# Patient Record
Sex: FEMALE
[De-identification: names, ages, dates, MRNs, and addresses within clinical notes are randomized per-mention and may not be internally consistent; named-entity substitution may affect disease eponyms.]

---

## 2017-12-21 ENCOUNTER — TRANSCRIPTION ENCOUNTER (OUTPATIENT)
Age: 32
End: 2017-12-21

## 2019-02-03 ENCOUNTER — FORM ENCOUNTER (OUTPATIENT)
Age: 34
End: 2019-02-03

## 2019-03-25 ENCOUNTER — APPOINTMENT (OUTPATIENT)
Dept: BREAST CENTER | Facility: CLINIC | Age: 34
End: 2019-03-25
Payer: COMMERCIAL

## 2019-03-25 VITALS
SYSTOLIC BLOOD PRESSURE: 132 MMHG | WEIGHT: 158 LBS | HEIGHT: 65 IN | DIASTOLIC BLOOD PRESSURE: 62 MMHG | BODY MASS INDEX: 26.33 KG/M2 | HEART RATE: 83 BPM

## 2019-03-25 DIAGNOSIS — Z80.1 FAMILY HISTORY OF MALIGNANT NEOPLASM OF TRACHEA, BRONCHUS AND LUNG: ICD-10-CM

## 2019-03-25 DIAGNOSIS — C77.3 MALIGNANT NEOPLASM OF UNSPECIFIED SITE OF UNSPECIFIED FEMALE BREAST: ICD-10-CM

## 2019-03-25 DIAGNOSIS — R92.8 OTHER ABNORMAL AND INCONCLUSIVE FINDINGS ON DIAGNOSTIC IMAGING OF BREAST: ICD-10-CM

## 2019-03-25 DIAGNOSIS — C50.912 MALIGNANT NEOPLASM OF UNSPECIFIED SITE OF LEFT FEMALE BREAST: ICD-10-CM

## 2019-03-25 DIAGNOSIS — Z79.899 OTHER LONG TERM (CURRENT) DRUG THERAPY: ICD-10-CM

## 2019-03-25 DIAGNOSIS — C50.919 MALIGNANT NEOPLASM OF UNSPECIFIED SITE OF UNSPECIFIED FEMALE BREAST: ICD-10-CM

## 2019-03-25 PROBLEM — Z00.00 ENCOUNTER FOR PREVENTIVE HEALTH EXAMINATION: Status: ACTIVE | Noted: 2019-03-25

## 2019-03-25 PROCEDURE — 99205 OFFICE O/P NEW HI 60 MIN: CPT

## 2019-03-25 RX ORDER — METOCLOPRAMIDE 5 MG/1
5 TABLET ORAL
Qty: 30 | Refills: 0 | Status: ACTIVE | COMMUNITY
Start: 2019-02-18

## 2019-03-25 RX ORDER — ONDANSETRON HYDROCHLORIDE 4 MG/1
TABLET, FILM COATED ORAL
Refills: 0 | Status: ACTIVE | COMMUNITY

## 2019-03-25 RX ORDER — BETAMETHA AC,SOD PHOS/WATER/PF 6 MG/ML
6 (3-3) VIAL (ML) INJECTION
Qty: 5 | Refills: 0 | Status: ACTIVE | COMMUNITY
Start: 2018-12-03

## 2019-03-25 RX ORDER — ONDANSETRON 8 MG/1
8 TABLET ORAL
Qty: 3 | Refills: 0 | Status: ACTIVE | COMMUNITY
Start: 2019-01-11

## 2019-03-25 RX ORDER — PNV NO.95/FERROUS FUM/FOLIC AC 28MG-0.8MG
TABLET ORAL
Refills: 0 | Status: ACTIVE | COMMUNITY

## 2019-03-25 RX ORDER — PROCHLORPERAZINE MALEATE 10 MG/1
10 TABLET ORAL
Qty: 30 | Refills: 0 | Status: ACTIVE | COMMUNITY
Start: 2019-01-28

## 2019-03-28 NOTE — REVIEW OF SYSTEMS
[Recent Weight Gain (___ Lbs)] : recent [unfilled] ~Ulb weight gain [Vomiting] : vomiting [Diarrhea] : diarrhea [Dysuria] : dysuria [Negative] : Heme/Lymph [Constipation] : no constipation

## 2019-03-28 NOTE — HISTORY OF PRESENT ILLNESS
[FreeTextEntry1] : Pt w/ L Br Ca SD during pregnancy (11/18)\par Delivered frat twin girl by [facility]ect (1/2/19) w/o prob\par Saw Gyn > Bialt Sono (11/8/18): +1.1cm irreg mass L 1:00 N9, +abnl L Ax LN > Bx rec'ed\par S/P L Sono Core Bx (L 1:00 N()(11/8/18): +IDCA, SBR III, ER-, AK-, Her2 3+\par S/P L Ax FNA (w/ Clip Placed)(11/12/18): +malig cells c/w Br\par MRI (2/4/19): +1.8cm mass L 1:00 c/t known Ca, +Bilat add'l abnl's > Rad'st rec'ed Bilat targeted Sono/Sono Bx/Poss MRI Bx, +2cm enh R liver mass\par PET/CT (2/19/19): No met dis, prob liver cysts\par Started Jay Adj chemo (AC/TCHP)([place])\par S/P L Br Bx (19yo): +FA\par BRCA ([facility])(11/18): -\par +FH Br Ca (M. Gr Aunt)\par No other Breast Surgery, Breast Procedures or Nipple Discharge. \par No FH Ovarian, Pancreatic Cancer or Melanoma. \par \par \par

## 2019-03-28 NOTE — PHYSICAL EXAM
[Normocephalic] : normocephalic [Atraumatic] : atraumatic [Supple] : supple [No Supraclavicular Adenopathy] : no supraclavicular adenopathy [No Cervical Adenopathy] : no cervical adenopathy [No Thyromegaly] : no thyromegaly [Normal Sinus Rhythm] : normal sinus rhythm [Examined in the supine and seated position] : examined in the supine and seated position [No dominant masses] : no dominant masses in right breast  [No dominant masses] : no dominant masses left breast [No Nipple Retraction] : no left nipple retraction [No Nipple Discharge] : no left nipple discharge [No Axillary Lymphadenopathy] : no left axillary lymphadenopathy [No Edema] : no edema [No Rashes] : no rashes [No Ulceration] : no ulceration [de-identified] : +R IC SIP in place [de-identified] : + vague th L 2:00 N8 poss c/t jaleelwn Ca?

## 2019-04-01 ENCOUNTER — FORM ENCOUNTER (OUTPATIENT)
Age: 34
End: 2019-04-01

## 2019-06-17 ENCOUNTER — FORM ENCOUNTER (OUTPATIENT)
Age: 34
End: 2019-06-17

## 2019-06-24 NOTE — HP
Admitting History and Physical





- Primary Care Physician


PCP: Zoltan Tirado





- Admission


Chief Complaint: Left breast cancer


History of Present Illness: 





34 year old premenapausal   female who felt a mass left upper quadrant 2018 while she was pregnant with twins at 28 weeks. She received neoadjuvant 

chemotherapy during her pregnancy. She delivered twins and finished 

chemotherapy and is on Taxotere and Herceptin and perjetta. She had follow MRI 

in 2019 that showed right hepatic hemangiomas and some enhancement right 

breast and directed Us revealed fibroadenomas and bxs were being recommended 

right upper inner and left retroareolar region.She had a good clinical response 

to her neoadjuvant chemotherapy.She is scheduled for bilateral mastectomies.


History Source: Patient


Limitations to Obtaining History: No Limitations





- Past Medical History


Endocrine: Yes: Other (PCOS)


Additional Past Medical History: 





S/P neoadjuvant chemotherapy for left breast cancer with axillary involvement





- Smoking History


Smoking history: Never smoked


Have you smoked in the past 12 months: No





- Alcohol/Substance Use


Hx Alcohol Use: Yes (rarely)





Home Medications





- Allergies


Allergies/Adverse Reactions: 


 Allergies











Allergy/AdvReac Type Severity Reaction Status Date / Time


 


No Known Allergies Allergy   Verified 19 09:04














- Home Medications


Home Medications (free text): vitamins,herceptin, perjetta





Family Disease History





- Family Disease History


Other Family History: pat aunt lung cpat GGM breast ca 84.  may GGM had rectal 

ca 82 and breast 84 and ovarian 55.  mat [place] breast ca 50.  mat GF lung ca 70





Physical Examination


Breast(s): Yes: Other (S/P c section and neoadjuvant chemotherapy Moderate 

ptotic full B sized cups no palpable mass left breast or adenopathy right 

negative)





Problem List





- Problems


(1) Breast cancer, left breast


Code(s): C50.912 - MALIGNANT NEOPLASM OF UNSPECIFIED SITE OF LEFT FEMALE BREAST

   


Qualifiers: 


   Breast location: upper outer quadrant of breast   Estrogen receptor status: 

negative   Patient sex: female   Qualified Code(s): C50.412 - Malignant 

neoplasm of upper-outer quadrant of left female breast; Z17.1 - Estrogen 

receptor negative status [ER-]   





Assessment/Plan





Bilateral total mastectomies left sentenel node biopsy,lymphoscintogram, 

possible axillary node dissection, left axillary node biopsy with needle 

localization

## 2019-06-26 ENCOUNTER — FORM ENCOUNTER (OUTPATIENT)
Age: 34
End: 2019-06-26

## 2019-06-27 ENCOUNTER — HOSPITAL ENCOUNTER (INPATIENT)
Dept: HOSPITAL 74 - JSAMEDAYSX | Age: 34
LOS: 2 days | Discharge: HOME | DRG: 581 | End: 2019-06-29
Attending: SURGERY | Admitting: SURGERY
Payer: COMMERCIAL

## 2019-06-27 VITALS — BODY MASS INDEX: 27.1 KG/M2

## 2019-06-27 DIAGNOSIS — Z17.1: ICD-10-CM

## 2019-06-27 DIAGNOSIS — M95.4: ICD-10-CM

## 2019-06-27 DIAGNOSIS — C50.412: Primary | ICD-10-CM

## 2019-06-27 PROCEDURE — 07B60ZX EXCISION OF LEFT AXILLARY LYMPHATIC, OPEN APPROACH, DIAGNOSTIC: ICD-10-PCS | Performed by: SURGERY

## 2019-06-27 PROCEDURE — 0HTV0ZZ RESECTION OF BILATERAL BREAST, OPEN APPROACH: ICD-10-PCS | Performed by: SURGERY

## 2019-06-27 PROCEDURE — 0HUV0JZ SUPPLEMENT BILATERAL BREAST WITH SYNTHETIC SUBSTITUTE, OPEN APPROACH: ICD-10-PCS | Performed by: SURGERY

## 2019-06-27 PROCEDURE — A9541 TC99M SULFUR COLLOID: HCPCS

## 2019-06-27 RX ADMIN — CEFAZOLIN SCH MLS/HR: 1 INJECTION, POWDER, FOR SOLUTION INTRAVENOUS at 21:15

## 2019-06-27 RX ADMIN — CEFAZOLIN SCH MLS: 1 INJECTION, POWDER, FOR SOLUTION INTRAVENOUS at 21:15

## 2019-06-27 RX ADMIN — ACETAMINOPHEN PRN MG: 10 INJECTION, SOLUTION INTRAVENOUS at 17:47

## 2019-06-28 LAB
DEPRECATED RDW RBC AUTO: 16.1 % (ref 11.6–15.6)
HCT VFR BLD CALC: 29.2 % (ref 32.4–45.2)
HGB BLD-MCNC: 9.9 GM/DL (ref 10.7–15.3)
MCH RBC QN AUTO: 32.1 PG (ref 25.7–33.7)
MCHC RBC AUTO-ENTMCNC: 34.1 G/DL (ref 32–36)
MCV RBC: 94.3 FL (ref 80–96)
PLATELET # BLD AUTO: 212 K/MM3 (ref 134–434)
PMV BLD: 7.9 FL (ref 7.5–11.1)
RBC # BLD AUTO: 3.09 M/MM3 (ref 3.6–5.2)
WBC # BLD AUTO: 9.1 K/MM3 (ref 4–10)

## 2019-06-28 RX ADMIN — HEPARIN SODIUM SCH UNIT: 5000 INJECTION, SOLUTION INTRAVENOUS; SUBCUTANEOUS at 10:36

## 2019-06-28 RX ADMIN — CEFAZOLIN SCH MLS/HR: 1 INJECTION, POWDER, FOR SOLUTION INTRAVENOUS at 15:19

## 2019-06-28 RX ADMIN — BACITRACIN ZINC SCH APPLIC: 500 OINTMENT TOPICAL at 10:40

## 2019-06-28 RX ADMIN — CEFAZOLIN SCH MLS/HR: 1 INJECTION, POWDER, FOR SOLUTION INTRAVENOUS at 02:31

## 2019-06-28 RX ADMIN — ACETAMINOPHEN PRN MG: 10 INJECTION, SOLUTION INTRAVENOUS at 16:46

## 2019-06-28 RX ADMIN — HEPARIN SODIUM SCH UNIT: 5000 INJECTION, SOLUTION INTRAVENOUS; SUBCUTANEOUS at 21:27

## 2019-06-28 RX ADMIN — BACITRACIN ZINC SCH: 500 OINTMENT TOPICAL at 04:58

## 2019-06-28 RX ADMIN — ACETAMINOPHEN PRN MG: 10 INJECTION, SOLUTION INTRAVENOUS at 11:29

## 2019-06-28 RX ADMIN — DEXTROSE AND SODIUM CHLORIDE SCH MLS/HR: 5; 450 INJECTION, SOLUTION INTRAVENOUS at 10:37

## 2019-06-28 RX ADMIN — CEFAZOLIN SCH MLS/HR: 1 INJECTION, POWDER, FOR SOLUTION INTRAVENOUS at 10:39

## 2019-06-28 RX ADMIN — DEXTROSE AND SODIUM CHLORIDE SCH: 5; 450 INJECTION, SOLUTION INTRAVENOUS at 18:55

## 2019-06-28 NOTE — OP
DATE OF OPERATION:  2019

 

PREOPERATIVE DIAGNOSIS:  Left breast cancer upper outer quadrant status post

neoadjuvant chemotherapy during pregnancy.

 

POSTOPERATIVE DIAGNOSIS:  Left breast cancer upper outer quadrant status post

neoadjuvant chemotherapy during pregnancy.

 

PROCEDURE:  Bilateral total mastectomies with left axillary sentinel lymph node

biopsy as well as additional lymph node biopsy with needle localization and clip and

bilateral subpectoral direct implant reconstructions by Dr. Madsen with AlloDerm.  

 

ANESTHESIA:  General endotracheal anesthesia. 

 

PRIMARY SURGEON:  Dawn Bowman MD

 

FIRST ASSISTANT:  DOM Wright

 

PRIMARY SURGEON FOR THE BILATERAL DIRECT IMPLANT RECONSTRUCTIONS:  Dawn Madsen MD

 

FIRST ASSISTANT:  Jesus Bobby MD

 

COMPLICATIONS:  There were no complications.

 

INDICATIONS:  Briefly, the patient is a 34-year-old  premenopausal white female

of [place] descent.  She has a strong family history with her paternal

great-grandmother who had breast cancer at age 84.  She has a paternal aunt who had

lung cancer at age 54. She has a maternal great-grandmother who had rectal cancer,

breast cancer, and ovarian cancer.  She has a maternal great-aunt with breast cancer

at age 50, and a maternal grandfather who had lung cancer at age 70.  The patient

felt a mass in the upper outer aspect of the left breast, and I saw her when she was

28 weeks pregnant with twins through IVF.  She had a 1.8 x 0.8 x 1.5 cm density on

ultrasound on the upper outer aspect of the left breast with a suspicious left

axillary lymph node, and core biopsy showed a high grade invasive duct cancer, which

was ER negative, TN negative, HER2 3+, and lymph node biopsy was positive for

metastatic disease.  She was advised on undergoing neoadjuvant chemotherapy with Dr. Stokes during pregnancy.  She did well and then had a  delivering normal

twins, and then received further chemotherapy with Taxotere, Herceptin, and Perjeta. 

After chemotherapy, MRI showed some mild left retroareolar enhancement and some

benign enhancement of the right breast.  She underwent genetic testing, which was

negative.  The patient was advised on undergoing surgery, and she chose to undergo

bilateral mastectomies and was seen by Plastic Surgery, and was it was decided on

undergoing bilateral subpectoral implant reconstruction.  Patient understood the need

for a sentinel lymph node biopsy as well as localized biopsy of the previously

positive node.  

 

DESCRIPTION OF PROCEDURE:  She was brought in for the procedure on 2019. 

She first went to Radiology at [facility] where needle localization

was performed of the previous clip in the lymph node as well as another questionably

suspicious node which was localized.  She underwent a lymphoscintigraphy in Nuclear

Medicine, and was brought up to the holding area.  In the holding area, site

verification was made and informed consent was obtained.  She was marked

preoperatively by the plastic surgeons.  

 

She was brought into the operating room and laid on the OR table in the supine

position.  Venodynes were placed on the lower extremities prior to induction.  She

received 2 g of Ancef prior to incision.  She underwent general endotracheal

anesthesia.  Then, 3 mL of lymphazurin blue were injected intradermally around the

left breast nipple areolar complex.  Both breasts were then sterilely prepped and

draped in the usual fashion with the left arm and axilla prepped in the field.  

 

The left axillary sentinel lymph node biopsy was first performed.  An incision was

made just below the hair-bearing area of the left axilla, and dissection was

undertaken, and blue lymphatics were easily seen coursing to a blue hot lymph node

with a 10-second gamma count of 44,957.  We took out the 2 needle localization areas

of lymph nodes as a superior and inferior specimen and sent them for specimen

radiograph, and the inferior specimen did have the clip, and we dissected out the

node with the clip, sent that for frozen section, which came back negative.  The

superior specimen had a blue hot lymph node with a 10-second gamma count of 2,894,

and that was sent for frozen section and came back negative.  Another hot node with a

10-second gamma count around 4,000 was sent for frozen section and also came back

negative.  At this point, there were no other blue or hot nodes.  There was some

axillary tissue which had to be removed with the needle localizations which were sent

separately as separate left axillary tissue for permanent section.  Hemostasis was

achieved. 

 

The left breast total mastectomy was then performed with a skin-sparing technique

encompassing the nipple areolar complex through an elliptical incision.  Skin flaps

were raised using electrocautery superiorly to the level of the clavicle, medially to

the level of the sternum, laterally to the level of the latissimus, and inferiorly

below the level of the inframammary fold.  The breast was taken out off the

pectoralis major muscle from medial to lateral, completely removed intact.  It was

oriented with the long-lateral short-superior suture, sent for specimen radiograph,

showed removal of the cancerous clip in the upper outer quadrant.  Hemostasis was

achieved.  A separate anterior margin was then taken on the superior skin flap and

sent separately as upper outer quadrant anterior margin with the suture marking the

biopsy cavity side.  This was sent for permanent section in formalin.  Skin flaps

were trimmed, and the wound was copiously irrigated, and hemostasis was achieved. 

The breast was weighed to allow for appropriate cosmetic reconstruction.  

 

At this point, gloves, gowns, instruments were changed, and the right breast was then

approached.  The right breast prophylactic mastectomy was performed again using a

skin-sparing technique with an elliptical incision around the nipple areolar complex.

 The skin flap was raised superiorly to the level of the clavicle, medially to the

level of the sternum, laterally to the level of the latissimus, and inferiorly below

the level of the inframammary fold.  The breast was taken out off the pectoralis

major muscle from medial to lateral, and completely removed intact.  It was oriented

with the long-lateral short-superior suture, and weighed to allow for appropriate

cosmetic result.  It was then placed in formalin, sent to Pathology as specimen. 

Skin flaps were trimmed for good cosmetic result.  Hemostasis was achieved, and the

wound was copiously irrigated with warm sterile saline.  

 

At the end of the mastectomy, it was noted that there was a slight superficial burn

towards the axilla in the upper outer aspect of the right breast maybe 2 cm in

length, likely caused by one of the _____ retractors.  

 

Dr. Madsen then became the primary surgeon, performed bilateral direct implant

reconstructions in the subpectoral location.  AlloDerm was sutured in an inferior

lateral aspect to both pectoralis major muscles to allow for the direct implant

reconstructions.  Two Dre drains will be placed around each implant, brought

through separate stab incisions on the lateral skin flaps and secured in place using

3-0 nylon suture.  All wounds will be closed by Plastic Surgery.  

 

Estimated blood loss after the bilateral mastectomies was about 100 mL, and she was

hemodynamically stable throughout.  All sponge and needle counts were correct at the

end of the case.  The patient will be extubated, recovered in the post-anesthesia

care unit, then be admitted to the floor for postoperative wound and pain management.

 Exparel was injected into the chest walls of both breasts after the mastectomy for

postoperative pain control.  We will place Bacitracin and cold compresses over the

small area of superficial burn on the upper outer aspect of the right breast over the

axilla.  

 

 

DAWN BOWMAN M.D.

 

[id number]

DD: 2019 19:52

DT: 2019 08:06

Job #:  [id number]

## 2019-06-28 NOTE — PN
Progress Note (short form)





- Note


Progress Note: 





Anesthesia POD#1


S/P Bilateral mastectomy with Left axillary node dissection under GA and PCA


VSS,arouse able,no N/V,pain is under control with Dilaudid PCA.


Not taking orals yet.


A/P


Continue PCA for today.





Nany Saunders MD

## 2019-06-28 NOTE — PN
Progress Note, Physician


Chief Complaint: 





Left breast cancer with axillary node involvement S/P bilateral total 

mastectomies left sentenl node and axillary node biopsy implant and alloderm 

reconstruction


History of Present Illness: 





patient was out of bed and felt faint ,  she feels related to oxycodone, BP 

stable,  she feels tightness from implants





- Current Medication List


Current Medications: 


Active Medications





Acetaminophen (Tylenol -)  650 mg PO Q4H PRN


   PRN Reason: FEVER


Acetaminophen (Ofirmev Injection -)  1,000 mg IVPB Q6H PRN


   PRN Reason: MILD PAIN


   Last Admin: 06/27/19 17:47 Dose:  1,000 mg


Bacitracin (Bacitracin -)  1 applic TP DAILY [facility]


   Last Admin: 06/28/19 04:58 Dose:  Not Given


Heparin Sodium (Porcine) (Heparin -)  5,000 unit SQ BID@0800,2000 [facility]


Dextrose/Sodium Chloride (D5-1/2ns -)  1,000 mls @ 100 mls/hr IV ASDIR [facility]


Cefazolin Sodium 1 gm/ (Dextrose)  50 mls @ 100 mls/hr IVPB Q6H-IV [facility]


   Stop: 06/28/19 20:59


   Last Admin: 06/28/19 02:31 Dose:  100 mls/hr


Oxycodone HCl (Roxicodone -)  10 mg PO Q4H PRN


   PRN Reason: PAIN LEVEL 6-10


Oxycodone HCl (Roxicodone -)  5 mg PO Q4H PRN


   PRN Reason: PAIN LEVEL 1-5


   Last Admin: 06/28/19 06:40 Dose:  5 mg


Zolpidem Tartrate (Ambien -)  5 mg PO HS PRN


   PRN Reason: Insomnia











- Objective


Vital Signs: 


 Vital Signs











Temperature  98.4 F   06/28/19 06:29


 


Pulse Rate  73   06/28/19 06:29


 


Respiratory Rate  20   06/28/19 06:29


 


Blood Pressure  143/75   06/28/19 06:29


 


O2 Sat by Pulse Oximetry (%)  99   06/27/19 22:15











Constitutional: Yes: No Distress


Breast(s): Yes: Other (Bilateral chest wall flaps viable with moderate 

echymosis incision intact drains functioning well  small burn area clean and no 

erythema or signs of infection bacitracin applied with dry  dressing)


Labs: 


 CBC, BMP





 06/28/19 06:45 











Problem List





- Problems


(1) Breast cancer, left breast


Code(s): C50.912 - MALIGNANT NEOPLASM OF UNSPECIFIED SITE OF LEFT FEMALE BREAST

   


Qualifiers: 


   Breast location: upper outer quadrant of breast   Estrogen receptor status: 

negative   Patient sex: female   Qualified Code(s): C50.412 - Malignant 

neoplasm of upper-outer quadrant of left female breast; Z17.1 - Estrogen 

receptor negative status [ER-]   





Assessment/Plan





IV antibiotics


SCD while in bed 


spirometry


will decrease oxycodone  to 2.5 mg and add valium 5 mg bid prn spasm 


OOB with assistance


IV tylenol given

## 2019-06-28 NOTE — PN
Progress Note (short form)





- Note


Progress Note: 





Anesthesia post op note.


POD#1, S/P bilateral mastectomy under general anesthesia.


pat seen and examined. VSS. pain well controlled by po meds.


No apparent post anesthesia complications.

## 2019-06-29 VITALS — DIASTOLIC BLOOD PRESSURE: 55 MMHG | SYSTOLIC BLOOD PRESSURE: 121 MMHG | HEART RATE: 89 BPM | TEMPERATURE: 98.3 F

## 2019-06-29 RX ADMIN — BACITRACIN ZINC SCH APPLIC: 500 OINTMENT TOPICAL at 09:27

## 2019-06-29 RX ADMIN — HEPARIN SODIUM SCH UNIT: 5000 INJECTION, SOLUTION INTRAVENOUS; SUBCUTANEOUS at 08:25

## 2019-06-29 NOTE — PN
Progress Note (short form)





- Note


Progress Note: 





Doing very well


Skin flaps great 


Miniml pain


FU next week 


All instructions given

## 2019-06-29 NOTE — PN
Progress Note, Physician


Chief Complaint: 





left breast cancer upper outer quadrant node positive s/p neoadjuvant 

chemotherapy


History of Present Illness: 


The patient developed a ER-/DC- HER2+ left breast cancer while pregnant with 

twins and underwent neoadjuvant chemotherapy and delivered her twins.  She 

finished chemotherapy and presented for bilateral total mastectomies with a 

left axillary sentinel lymph node biopsy and localization of a previous 

positive lymph node with bilateral direct to implant reconstruction with 

alloderm on June 27, 2019.  She did well and was admitted postoperatively for 

post op pain and wound management.








- Current Medication List


Current Medications: 


Active Medications





Acetaminophen (Tylenol -)  650 mg PO Q4H PRN


   PRN Reason: FEVER


Acetaminophen (Ofirmev Injection -)  1,000 mg IVPB Q6H PRN


   PRN Reason: MILD PAIN


   Last Admin: 06/28/19 16:46 Dose:  1,000 mg


Bacitracin (Bacitracin -)  1 applic TP DAILY [facility]


   Last Admin: 06/29/19 09:27 Dose:  1 applic


Diazepam (Valium -)  5 mg PO BID PRN


   PRN Reason: MUSCLE SPASMS


   Last Admin: 06/28/19 15:19 Dose:  5 mg


Heparin Sodium (Porcine) (Heparin -)  5,000 unit SQ BID@0800,2000 [facility]


   Last Admin: 06/29/19 08:25 Dose:  5,000 unit


Dextrose/Sodium Chloride (D5-1/2ns -)  1,000 mls @ 100 mls/hr IV ASDIR [facility]


   Last Admin: 06/28/19 18:55 Dose:  Not Given


Oxycodone HCl (Roxicodone -)  2.5 mg PO Q4H PRN


   PRN Reason: PAIN LEVEL 1-5


   Last Admin: 06/29/19 08:23 Dose:  2.5 mg


Oxycodone HCl (Roxicodone -)  5 mg PO Q4H PRN


   PRN Reason: PAIN LEVEL 6-10


Zolpidem Tartrate (Ambien -)  5 mg PO HS PRN


   PRN Reason: Insomnia


   Last Admin: 06/28/19 21:39 Dose:  5 mg











- Objective


Vital Signs: 


 Vital Signs











Temperature  98.2 F   06/29/19 06:18


 


Pulse Rate  91 H  06/29/19 06:18


 


Respiratory Rate  20   06/29/19 06:18


 


Blood Pressure  136/61   06/29/19 06:18


 


O2 Sat by Pulse Oximetry (%)  96   06/28/19 09:00











Constitutional: Yes: Well Nourished, No Distress, Calm


Eyes: Yes: WNL


HENT: Yes: Nasal Congestion


Neck: Yes: WNL


Cardiovascular: Yes: Regular Rate and Rhythm


Respiratory: Yes: Regular, CTA Bilaterally


Gastrointestinal: Yes: Normal Bowel Sounds, Soft


...Rectal Exam: Yes: Deferred


Genitourinary: Yes: WNL


Breast(s): Yes: Other (Wounds clean, dry, and intact.  Drains functioning well.

  Skin flaps warm and viable.)


Musculoskeletal: Yes: WNL


Extremities: Yes: WNL


Integumentary: Yes: WNL


Wound/Incision: Yes: Clean/Dry, Well Approximated


Neurological: Yes: Alert, Oriented


Psychiatric: Yes: WNL


Labs: 


 CBC, BMP





 06/28/19 06:45 











Problem List





- Problems


(1) Breast cancer, left breast


Assessment/Plan: 


The patient is doing well POD#2 s/p bilateral total mastectomies with left SLN 

biopsy and bilateral direct to implant reconstructions with alloderm.  Her skin 

flaps are warm and viable.  Drains are functioning well.  Dressing changed at 

bedside today.  She has good pain control and is stable for discharge today.  

Home on percocet for pain and cefadroxil antibiotics.  Follow up in [facility] and [facility] offices in 1 week.  No heavy lifting or exercise.  Record 

drain outputs daily.  No bath/shower until drains removed.


Code(s): C50.912 - MALIGNANT NEOPLASM OF UNSPECIFIED SITE OF LEFT FEMALE BREAST

   


Qualifiers: 


   Breast location: upper outer quadrant of breast   Estrogen receptor status: 

negative   Patient sex: female   Qualified Code(s): C50.412 - Malignant 

neoplasm of upper-outer quadrant of left female breast; Z17.1 - Estrogen 

receptor negative status [ER-]

## 2019-06-29 NOTE — DS
Physical Examination


Vital Signs: 


 Vital Signs











Temperature  98.2 F   06/29/19 06:18


 


Pulse Rate  91 H  06/29/19 06:18


 


Respiratory Rate  20   06/29/19 06:18


 


Blood Pressure  136/61   06/29/19 06:18


 


O2 Sat by Pulse Oximetry (%)  96   06/28/19 09:00











Constitutional: Yes: Well Nourished, No Distress, Calm


Eyes: Yes: WNL


HENT: Yes: WNL


Neck: Yes: WNL


Cardiovascular: Yes: Regular Rate and Rhythm


Respiratory: Yes: Regular, CTA Bilaterally


Gastrointestinal: Yes: Normal Bowel Sounds, Soft


...Rectal Exam: Yes: Deferred


Renal/: Yes: WNL


Breast(s): Yes: Other (Mastectomy wounds clean, dry, and intact.  Drains 

functioning well.  Skin flaps warm and viable.)


Musculoskeletal: Yes: WNL


Extremities: Yes: WNL


Integumentary: Yes: WNL


Wound/Incision: Yes: Clean/Dry, Well Approximated


Neurological: Yes: Alert, Oriented


Psychiatric: Yes: WNL


Labs: 


 CBC, BMP





 06/28/19 06:45 











Discharge Summary


Reason For Visit: LT BREAST CA


Left breast cancer upper outer quadrant ER-/TN- HER2+ s/p neoadjuvant 

chemotherapy





Procedures: Principal: Bilateral total mastectomies with left axillary sentinel 

lymph node biopsy and separate localized axillary node biopsy with bilateral 

direct to implant reconstructions with alloderm


Hospital Course: 


The patient was admitted postoperatively after bilateral total mastectomies 

with bilateral direct to implant reconstructions.  She did well postoperatively 

and had good pain control and her skin flaps were warm and viable for discharge 

on POD#2.  She is to follow up in Sarath Madsen and Celestino's office in 1 week.





Condition: Good





- Instructions


Diet, Activity, Other Instructions: 


Post Operative Instructions - [facility]





We hope your recovery will be uneventful. For those of you who have been given


general anesthesia, there is a possibility you might have some lightheadedness


and possibly nausea.





It is important that each patient, especially those who have had general 


anesthesia, follow these instructions, please:





1. Do NOT operate a motor vehicle for 24 hours.


2. Do NOT drink any alcoholic beverages for 24 hours.


3. Do NOT take any sedatives, narcotics, or tranquilizers for 24 hours


    unless specifically ordered by your surgeon.


4. Do NOT undertake any strenuous exercise or outside activity for 24 hours 


    unless specifically permitted by your surgeon.


5. Eat light foods that are easy to digest. If you have any problems with nausea


   and vomiting, lie down and rest. If it continues, call your surgeon.


6. Call your surgeon AT ONCE if you have problems with:


   a. Bleeding


   b. Urinating


   c. Excessive pain or drainage


   d. Numbness


      


If any problems occur, call your physician first. If you cannot reach him/her, 

call


the Ambulatory Surgery Unit at [phone], or the Emergency Room at [phone].


Follow up with Sarath Tirado / Maryanne in 7 days.





Medication: Vicodin E-S OR Percocet 1-2 tablets every 4-6 hrs as needed for 5-7 

days.





Wound Care: Keep wound dry and clean for 48 hours. You may remove the dressing 

after 


                    48 hours and may shower. Keep steri-strips in place until 

follow-up appointment


                    No heavy lifting or strenuous activities.


Post Operative Instructions - [facility]





We hope your recovery will be uneventful. For those of you who have been given


general anesthesia, there is a possibility you might have some lightheadedness


and possibly nausea.





It is important that each patient, especially those who have had general 


anesthesia, follow these instructions, please:





1. Do NOT operate a motor vehicle for 24 hours.


2. Do NOT drink any alcoholic beverages for 24 hours.


3. Do NOT take any sedatives, narcotics, or tranquilizers for 24 hours


    unless specifically ordered by your surgeon.


4. Do NOT undertake any strenuous exercise or outside activity for 24 hours 


    unless specifically permitted by your surgeon.


5. Eat light foods that are easy to digest. If you have any problems with nausea


   and vomiting, lie down and rest. If it continues, call your surgeon.


6. Call your surgeon AT ONCE if you have problems with:


   a. Bleeding


   b. Urinating


   c. Excessive pain or drainage


   d. Numbness


      


If any problems occur, call your physician first. If you cannot reach him/her, 

call


the Ambulatory Surgery Unit at [phone], or the Emergency Room at [phone].


Follow up with Sarath Tirado / Maryanne in 7 days.





Medication: Vicodin E-S OR Percocet 1-2 tablets every 4-6 hrs as needed for 5-7 

days.





Wound Care: Keep wound dry and clean for 48 hours. You may remove the dressing 

after 


                    48 hours and may shower. Keep steri-strips in place until 

follow-up appointment


                    No heavy lifting or strenuous activities.


Post Operative Instructions - [facility]





We hope your recovery will be uneventful. For those of you who have been given


general anesthesia, there is a possibility you might have some lightheadedness


and possibly nausea.





It is important that each patient, especially those who have had general 


anesthesia, follow these instructions, please:





1. Do NOT operate a motor vehicle for 24 hours.


2. Do NOT drink any alcoholic beverages for 24 hours.


3. Do NOT take any sedatives, narcotics, or tranquilizers for 24 hours


    unless specifically ordered by your surgeon.


4. Do NOT undertake any strenuous exercise or outside activity for 24 hours 


    unless specifically permitted by your surgeon.


5. Eat light foods that are easy to digest. If you have any problems with nausea


   and vomiting, lie down and rest. If it continues, call your surgeon.


6. Call your surgeon AT ONCE if you have problems with:


   a. Bleeding


   b. Urinating


   c. Excessive pain or drainage


   d. Numbness


      


If any problems occur, call your physician first. If you cannot reach him/her, 

call


the Ambulatory Surgery Unit at [phone], or the Emergency Room at [phone].


Follow up with Sarath Tirado / Maryanne in 7 days.





Medication: Vicodin E-S OR Percocet 1-2 tablets every 4-6 hrs as needed for 5-7 

days.





Wound Care: Keep wound dry and clean for 48 hours. You may remove the dressing 

after 


                    48 hours and may shower. Keep steri-strips in place until 

follow-up appointment


                    No heavy lifting or strenuous activities. WEAR BRA ,Empty 

and record AIDA output twice daily,No shower





Referrals: 


Zoltan Tirado MD [Staff Physician] - 


Les Madsen MD [Staff Physician] - 


Disposition: HOME





- Home Medications


Comprehensive Discharge Medication List: 


Ambulatory Orders





Cefadroxil 500 mg PO BID #20 capsule 06/28/19 


Diazepam [Valium] 5 mg PO Q8H PRN #10 tablet MDD 2 06/28/19 


Oxycodone HCl/Acetaminophen [Percocet 5-325 mg Tablet] 1 tab PO Q6H PRN #20 

tablet MDD 4 06/28/19

## 2019-07-01 NOTE — OP
DATE OF OPERATION:  06/27/2019

 

LOCATION:  [facility].

 

NOTE:  This is a combined dictation with Dr. Dawn Tirado for bilateral implant

and AlloDerm reconstruction.

 

PREOPERATIVE DIAGNOSES:

1.  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

(611.89).

2.  Personal history of genetic carcinoma.

 

POSTOPERATIVE DIAGNOSES:

1.  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

(611.89).

2.  Personal history of genetic carcinoma.

 

PROCEDURE:

1.  Right immediate breast reconstruction utilizing immediate insertion of silicone

breast implant and AlloDerm reconstruction.

2.  Left immediate breast reconstruction utilizing immediate insertion of silicone

breast implant and AlloDerm reconstruction.

3.  Intravenous injection of indocyanine green dye and intraoperative diagnostic

evaluation of non-coronary intraoperative fluorescein vascular angiography x 2. 

 

SURGEON:  Dr. MUKESH Madsen

 

ANESTHESIA: GENERAL

 

OPERATIVE PROCEDURE IN DETAIL:  The patient was taken to the operating room.  After

induction of general anesthesia in the supine position, both arms were extended and

padded.  Venodyne boots were placed.  The entire chest wall was painted with

ChloraPrep solution over its entire extent, and sterile drapes were placed in the

usual fashion.  The markings, which had been made in the standing position

preoperatively, were reoutlined with the patient's knowledge.  Time-out procedure was

performed. 

 

Attention was turned by Dr. Tirado to the mastectomies.  Bilateral inframammary

incisions were made and Dr. Tirado performed mastectomies. This will be dictated

under separate cover.

 

Upon completion of the mastectomies, the wounds were copiously irrigated and

attention was turned to the right breast.  A subpectoral dissection was begun on the

right breast, superiorly from the second rib, medially to the sternal fibers, and

down to the inframammary fold, elevating the pectoralis major muscle from its

insertion.

 

At this point, an 8.0 x 16.0 sheet of AlloDerm was brought into the field and sutured

superiorly along the pectoralis major muscle after rehydration.  This was carried

along the lateral mammary fold and down the side of the breast reconstruction.  At

this point, a Sientra style 107, high-profile, round, smooth, 415 mL implant was

chosen.  The left breast tissue removed was 326 gm, and the right breast

approximately 322 gm.  This implant was placed and then sutured with 3-0 Vicryl

suture continued along the inframammary fold, completely covering the implant itself.

 

The exact same procedure was carried out symmetrically on the opposite breast, also

placing a Sientra style 107, high-profile, round, smooth, 415 mL implant in the same

subpectoral pocket.  She had contour AlloDerm large sheets placed bilaterally for

retropectoral reconstruction.  Good symmetry was seen in the sitting position.  

 

After the implants were in place, the patient was injected with 10 mL of indocyanine

green dye and the Spy imaging system was brought into the field.  The skin flow to

the right and left breasts and the nipple areolar complex, and the entire skin flaps

were evaluated and seen to be viable with good blood flow. 

 

Two Dre drains were brought out through separate stab wounds laterally.  The Smart

Infuser pump catheter was inserted medially and into the subpectoral position.  Both

wounds were closed symmetrically using 3-0 PDS suture on the deep tissue, 3-0 in a

deep dermal fashion, and 4-0 in a subcuticular fashion.  Both wounds were dressed

sterilely with Mastisol and Steri-Strips with a surgical bra and a compression strap.

 

The patient tolerated the procedure well.  She was awakened, extubated and

transferred to the recovery room in satisfactory condition.

 

The assistant was present during the entire portion of the operation and closure. 

 

 

 

DAWN MADSEN M.D.

 

[id number]

DD: 07/01/2019 07:54

DT: 07/01/2019 11:11

Job #:  [id number]

## 2019-07-02 ENCOUNTER — FORM ENCOUNTER (OUTPATIENT)
Age: 34
End: 2019-07-02

## 2019-07-03 NOTE — PATH
Surgical Pathology Report



Patient Name:  JAARD ALICIA

Accession #:  [id number]

Med. Rec. #:  [id number]                                                        

   /Age/Gender:  1985 (Age: 34) / F

Account:  [id number]                                                          

             Location: [facility] MED/SURG

Taken:  2019

Received:  2019

Reported:  7/3/2019

Physicians:  Zoltan Tirado M.D.

  



Specimen(s) Received

A: SENTINEL LYMPH NODE LEFT AXILLA #1(FS) 

B: SENTINEL LYMPH NODE LEFT AXILLA  #2 (FS) 

C: LYMPH NODE WITH CLIP LEFT AXILLA (FS) 

D: SENTINEL LYMPH NODE LEFT AXILLA #4 (FS) 

E: LEFT BREAST MASTECTOMY 

F: RIGHT BREAST MASTECTOMY 

G: EXTRA  AXILLARY TISSUE LEFT SIDE 

H: LEFT ANTERIOR MARGIN 





Clinical History

Left breast cancer





Intraoperative Consult Diagnosis

A. [place] node #1 left axilla, frozen section: One negative lymph node (0/1).



B. [place] lymph node #2 left axilla, frozen section: One negative lymph node

(0/1).



C. Frozen section node with clip left axilla, frozen section: One negative lymph

node (0/1). Prior biopsy site changes and fibrosis present.



D. [place] node #4 left axilla, frozen section: One negative lymph node (0/1).



NORBERT Sanchez M.D., 2019









Final Diagnosis

A. lymph node, left axillary sentinel #1, excision (FS):

One lymph node, negative for metastatic carcinoma on H&E stained sections and

cytokeratin (AE1/3) immunostain (0/1). 

NO AREAS OF FIBROUS SCAR ARE IDENTIFIED IN THE LYMPH NODE. 



B. lymph node, left axillary sentinel #2, excision (FS):

One lymph node, negative for metastatic carcinoma on H&E stained sections and

cytokeratin (AE1/3) immunostain (0/1). 

NO AREAS OF FIBROUS SCAR ARE IDENTIFIED IN THE LYMPH NODE.



C. lymph node with clip, left axilla, EXCISION (FS):

One lymph node, negative for metastatic carcinoma on H&E stained sections and

cytokeratin (AE1/3) immunostain (0/1).

THE LYMPH NODE SHOWS A FOCUS OF FIBROUS SCAR AND ASSOCIATED CALCIFICATIONS,

CONSISTENT WITH PRIOR BIOPSY SITE/ SITE OF TREATED METASTATIC TUMOR.



D. lymph node, left axillary sentinel #4, excision (FS):

One lymph node, negative for metastatic carcinoma on H&E stained sections and

cytokeratin (AE1/3) immunostain (0/1).

NO AREAS OF FIBROUS SCAR ARE IDENTIFIED IN THE LYMPH NODE.



E. breast, left, total mastectomy:

Benign breast tissue showing areas of dense hyalinizing fibrosis, compatible

with treated tumor bed tissue.

No residual carcinoma is identified.

Nipple and skin with no pathologic findings.

ONE BENIGN INTRAMAMMARY LYMPH NODE.

Pathologic stage (ypTNM): ypT0 ypN0.



F. breast, right, total mastectomy:

Benign breast tissue.

Nipple and skin with no pathologic findings.



G. extra axillary tissue, left, excision:

SIX Lymph nodes, negative for metastatic carcinoma (0/6). 

NO AREAS OF FIBROUS SCAR ARE IDENTIFIED IN THE LYMPH NODES.



H. anterior margin, left, excision:

Benign breast tissue.





***Electronically Signed***

Gianna Sanchez M.D.





Gross Description

A. Received fresh labeled "sentinel node #1 left axilla," is a 0.7 x 0.5 x 0.5

cm lymph node with attached fat. The lymph node is bisected and entirely

submitted for frozen section. The frozen section residue is entirely submitted

in one cassette.



B. Received fresh labeled "sentinel lymph node #2 left axilla," is a 1.5 x 0.4 x

0.3 cm lymph node with attached fat. The lymph node is submitted in toto for

frozen section. The frozen section residue is entirely submitted in one

cassette.



C. Received fresh labeled "lymph node with clip left axilla," is a 1.5 x 1.1 x

0.6 cm portion of fibrofatty tissue containing a lymph node. There is a needle

localization wire present. There is a gray metallic biopsy clip located within

the lymph node. The specimen is bisected and entirely submitted for frozen

section. The frozen section residue is entirely submitted in one cassette.



D. Received fresh labeled "sentinel node #4 left axilla," is a 0.8 x 0.6 x 0.3

cm lymph node with attached fat. The lymph node is submitted in toto for frozen

section. The frozen section residue is entirely submitted in one cassette.



E. Received in formalin, labeled "left breast," is a 340 gram, 15.0 x 14.0 x 4.0

cm. left mastectomy specimen with a short suture marking the superior aspect and

a long suture marking the lateral aspect of the specimen, per the surgeon. The

anterior surface displays a 4.5 x 2.2 cm tan, elliptical portion of skin with a

1.2 cm in diameter nipple. The deep margin is inked black and the anterior soft

tissue margin is inked blue. The specimen is serially sectioned from medial to

lateral. Sectioning reveals abundant dense, white, focally firm fibrous tissue.

No definitive residual mass is identified. Representative sections are submitted

in 25 cassettes as follows: 1-serially sectioned nipple; 2-subareolar shave;

3-10-upper outer quadrant; 11-13-lower outer quadrant; 14-16-upper inner

quadrant; 17-19-lower inner quadrant; 20-skin and anterior soft tissue margin;

21-deep margin; 22-25- additional fibrous tissue.



F. Received in formalin, labeled "right breast," is a 325 gram, 13.5 x 13.0 x

3.3 cm. right mastectomy specimen with a short suture marking the superior

aspect and a long suture marking the lateral aspect of the specimen, per the

surgeon. The anterior surface displays a 4.3 x 2.5 cm tan, elliptical portion of

skin with a 1.0 cm in diameter nipple. The deep margin is inked black and the

anterior soft tissue margin is inked blue. The specimen is serially sectioned

from lateral to medial. Sectioning reveals abundant dense, white, focally firm

fibrous tissue. No definitive mass is identified. Representative sections are

submitted in 15 cassettes as follows: 1-serially sectioned nipple; 2-subareolar

shave; 3-4-upper outer quadrant; 5-6-lower outer quadrant; 7-9-upper inner

quadrant; 10-13-lower inner quadrant; 14-skin and anterior soft tissue margin;

15-deep margin.

Total formalin fixation time: Approximately 24 hours



G. Received in formalin labeled "extra axillary tissue left side," is a 7.0 x

6.0 x 2.3 cm aggregate of multiple portions of yellow, lobulated adipose tissue.

The largest portion displays an attached needle localization wire. Sectioning

reveals 3 possible lymph nodes measuring up to 1.7 cm in greatest dimension. The

lymph nodes are entirely submitted in 4 cassettes as follows: 1-one whole

possible lymph node; 2-one bisected lymph node; 3-4-one bisected lymph node.



H. Received in formalin labeled "left anterior margin," is a 3.2 x 3.0 x 1.4 cm

portion of fibroadipose tissue with a suture marking the biopsy cavity side, per

the surgeon. The new margin is inked blue and the specimen is serially

sectioned. The specimen is entirely and sequentially submitted in 5 cassettes.





[facility]

## 2019-07-16 ENCOUNTER — FORM ENCOUNTER (OUTPATIENT)
Age: 34
End: 2019-07-16

## 2019-08-05 ENCOUNTER — FORM ENCOUNTER (OUTPATIENT)
Age: 34
End: 2019-08-05

## 2019-08-20 ENCOUNTER — FORM ENCOUNTER (OUTPATIENT)
Age: 34
End: 2019-08-20

## 2019-11-19 ENCOUNTER — FORM ENCOUNTER (OUTPATIENT)
Age: 34
End: 2019-11-19

## 2020-06-02 ENCOUNTER — FORM ENCOUNTER (OUTPATIENT)
Age: 35
End: 2020-06-02

## 2020-06-05 ENCOUNTER — RESULT REVIEW (OUTPATIENT)
Age: 35
End: 2020-06-05

## 2020-06-08 ENCOUNTER — RESULT REVIEW (OUTPATIENT)
Age: 35
End: 2020-06-08

## 2020-06-24 ENCOUNTER — HOSPITAL ENCOUNTER (INPATIENT)
Dept: HOSPITAL 74 - FM/S | Age: 35
Discharge: HOME | DRG: 584 | End: 2020-06-24
Attending: SURGERY | Admitting: SURGERY
Payer: COMMERCIAL

## 2020-06-24 VITALS — TEMPERATURE: 98.2 F

## 2020-06-24 VITALS — HEART RATE: 72 BPM | DIASTOLIC BLOOD PRESSURE: 62 MMHG | SYSTOLIC BLOOD PRESSURE: 118 MMHG

## 2020-06-24 VITALS — BODY MASS INDEX: 24.1 KG/M2

## 2020-06-24 DIAGNOSIS — M95.4: ICD-10-CM

## 2020-06-24 DIAGNOSIS — T85.42XA: ICD-10-CM

## 2020-06-24 DIAGNOSIS — N65.1: Primary | ICD-10-CM

## 2020-06-24 DIAGNOSIS — Z85.3: ICD-10-CM

## 2020-06-24 DIAGNOSIS — N65.0: ICD-10-CM
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
## 2020-06-24 PROCEDURE — 0HPT0JZ REMOVAL OF SYNTHETIC SUBSTITUTE FROM RIGHT BREAST, OPEN APPROACH: ICD-10-PCS | Performed by: SURGERY

## 2020-06-24 PROCEDURE — 0HPU0JZ REMOVAL OF SYNTHETIC SUBSTITUTE FROM LEFT BREAST, OPEN APPROACH: ICD-10-PCS | Performed by: SURGERY

## 2020-06-24 PROCEDURE — 0HRV0JZ REPLACEMENT OF BILATERAL BREAST WITH SYNTHETIC SUBSTITUTE, OPEN APPROACH: ICD-10-PCS | Performed by: SURGERY

## 2020-06-24 NOTE — SURG
Surgery First Assist Note


First Assist: Tanner Mary PA-C


Date of Service: 06/24/20


Diagnosis: 


Breast cancer s/p bilateral mastectomy with reconstrution





Procedure: 


 Revision of bilateral breast reconstruction, with implant exchange, and 

subcutaneous tissue transfer to bilateral breasts





I was present for the entirety of the operative procedure. For further detail, 

please refer to operative report.








Visit type





- Case Type


Case Type: Scheduled





- Emergency


Emergency Visit: No





- New patient


This patient is new to me today: Yes


Date on this admission: 06/24/20





- Critical Care


Critical Care patient: No

## 2020-06-24 NOTE — OP
Operative Note





- Note:


Operative Date: 06/24/20


Pre-Operative Diagnosis: Breast cancer s/p bilateral mastectomy with 

reconstrution


Operation: Revision of bilateral breast reconstruction, with implant exchange, 

and subcutaneous tissue transfer to bilateral breasts


Post-Operative Diagnosis: Same as Pre-op


Surgeon: Les Madsen


Assistant: Tanner Mary


Anesthesiologist/CRNA: Dina Monreal


Anesthesia: General


Estimated Blood Loss (mls): 10


Operative Report Dictated: Yes

## 2020-07-03 NOTE — OP
DATE OF OPERATION:  06/24/2020

 

SURGEON:  Dawn Madsen MD

 

ASSISTANT SURGEON:  Tanner Mary PA-C

 

PREOPERATIVE DIAGNOSES:

1.  Bilateral acquired chest wall deformity, status post bilateral mastectomy.

2.  Asymmetry of reconstructed chest wall.

3.  Personal history of breast carcinoma.

4.  Malposition and mechanical complication of breast implant.

 

POSTOPERATIVE DIAGNOSES:

1.  Bilateral acquired chest wall deformity, status post bilateral mastectomy.

2.  Asymmetry of reconstructed chest wall.

3.  Personal history of breast carcinoma.

4.  Malposition and mechanical complication of breast implant.

 

OPERATIVE PROCEDURE:

1.  Right breast reconstruction utilizing other technique.

2.  Left breast reconstruction utilizing other technique.

3.  Right breast capsulotomy, removal and replacement of right breast implant.

4.  Left breast capsulotomy, removal and replacement of left breast implant.

 

OPERATIVE INDICATION:  Patient is a young woman of 35 years old, who underwent

bilateral breast reconstruction and now presents with asymmetry of the 
reconstructed

chest wall and requires the above procedures for the indications.  The risks and

benefits, surgical versus nonsurgical alternatives, as well as the material

complications were described to the patient on multiple occasions 
preoperatively, and

she agreed to the planned procedure.

 

OPERATIVE PROCEDURE IN DETAIL:  Patient was taken to the operating room where 
after

the induction of general anesthesia in supine position, both arms were extended 
and

padded, Venodyne boots were placed, and the entire area was prepped and draped 
with

timeout called.

 

At this point, the mastectomy scars were injected with Marcaine 0.25% with

epinephrine.  At this point, the mastectomy scars were excised along the mid-
portion

of the breast in transverse fashion according to the previous scars, and this 
was

carried down through the skin, through the subcutaneous tissue, down to the

underlying musculature.  The muscles were then divided along the course of the

incision, and the capsule was visualized.  I then performed a capsulotomy by 
incising

the capsule along the course of the incision, down through tissue, into the area

where the capsule was opened and the implant seen and removed.  This right 
breast

implant was sent for pathologic diagnosis as was the capsule itself.  Once this 
was

accomplished, capsulotomy was performed throughout the pocket superiorly, 
medially,

and in all directions for correction of the asymmetry and malposition.  Using 
the

electrocautery, hemostasis was obtained throughout the pocket, creating a space 
for the

new reconstructive implant.

 

At this point, attention was turned to the opposite breast.  The same or similar

excision of the skin and subcutaneous tissue of the mastectomy scar was carried 
out

of the left breast.  Dissection was carried through the subcutaneous tissue and 
also

performed capsulotomy to remove the implant and expand the pocket.  At this 
point,

after copious irrigation of the pocket with both triple-antibiotic solution and

Betadine, an implant was chosen.

 

A Sientra smooth, round, high-profile, silicone gel style 107, 440-mL implant 
was

chosen.  This was placed into the breast pocket using the Brennan funnel.  This

no-touch technique was carried out to prevent any bacterial contaminations.  
Once the

implant was in place, the wounds were closed in layers using 2-0 PDS suture on 
the

deep muscularis and deeper tissues in interrupted fashion, and once this deeper 
layer

on both the right and left breast was closed independently, attention was turned
to

the abdominal wall.

 

In order for reconstructive tissue to be harvested, incisions were made down 
through

skin and subcutaneous tissue, through the subcutaneous tissue into the deep 
layers

over the rectus muscle medially and laterally over the external oblique 
musculature. 

At this point, tissue was then harvested from these areas over the rectus and

external oblique muscles, transferred to the back table, washed, cleansed, and

prepared for reconstruction.  Once this tissue was harvested, donor sites were 
closed

with interrupted running sutures, and then, the tissue was transferred to the 
medial,

superior, central portions of the right breast; the inferior, superior, medial, 
and

central portions of the left breast independently.  Good shape and symmetry were
seen

in the sitting position.  At this point, after the donor sites had been closed, 
the

mastectomy scars were also closed using 3-0 PDS suture in the deep dermis and 4-
0

absorbable suture on the skin.  Patient tolerated the procedure well.  All 
wounds

were dressed sterilely with Dermabond, Steri-Strips, and a compressive dressing 
with

a Surgi-Bra.  She tolerated the procedure well, awakened, extubated, and 
transferred

to the recovery room in satisfactory condition.

 

 

DAWN MADSEN M.D.

 

[id number]

DD: 07/03/2020 13:42

DT: 07/03/2020 18:44

Job #:  [id number]

MTDD

## 2020-12-01 ENCOUNTER — FORM ENCOUNTER (OUTPATIENT)
Age: 35
End: 2020-12-01

## 2021-06-09 ENCOUNTER — APPOINTMENT (OUTPATIENT)
Dept: BREAST CENTER | Facility: CLINIC | Age: 36
End: 2021-06-09

## 2021-07-06 DIAGNOSIS — E28.2 POLYCYSTIC OVARIAN SYNDROME: ICD-10-CM

## 2021-07-06 DIAGNOSIS — Z78.9 OTHER SPECIFIED HEALTH STATUS: ICD-10-CM

## 2021-07-06 DIAGNOSIS — Z80.3 FAMILY HISTORY OF MALIGNANT NEOPLASM OF BREAST: ICD-10-CM

## 2021-07-06 DIAGNOSIS — Z31.83 ENCOUNTER FOR ASSISTED REPRODUCTIVE FERTILITY PROCEDURE CYCLE: ICD-10-CM

## 2021-07-09 ENCOUNTER — APPOINTMENT (OUTPATIENT)
Dept: BREAST CENTER | Facility: CLINIC | Age: 36
End: 2021-07-09
Payer: COMMERCIAL

## 2021-07-09 ENCOUNTER — NON-APPOINTMENT (OUTPATIENT)
Age: 36
End: 2021-07-09

## 2021-07-09 VITALS
HEART RATE: 88 BPM | BODY MASS INDEX: 26.33 KG/M2 | SYSTOLIC BLOOD PRESSURE: 108 MMHG | HEIGHT: 65 IN | WEIGHT: 158 LBS | DIASTOLIC BLOOD PRESSURE: 73 MMHG

## 2021-07-09 PROCEDURE — 99213 OFFICE O/P EST LOW 20 MIN: CPT

## 2021-07-09 NOTE — REVIEW OF SYSTEMS
[Recent Weight Gain (___ Lbs)] : recent [unfilled] ~Ulb weight gain [Abdominal Pain] : abdominal pain [Heartburn] : heartburn [Muscle Weakness] : muscle weakness [As Noted in HPI] : as noted in HPI [Easy Bruising] : a tendency for easy bruising [Negative] : Psychiatric

## 2021-07-09 NOTE — ASSESSMENT
[FreeTextEntry1] : The patient is a 36-year-old G4, P3 premenopausal white female of [place] descent. She underwent menarche at age 12 and had her first child at age 31. She has a strong family history with her paternal aunt who had lung cancer at age 54 and her paternal great grandmother who had breast cancer at age 84. Her maternal great grandmother had rectal cancer at age 82 and then breast cancer at age 84 and then ovarian cancer at age 55. She has a maternal great aunt who had breast cancer at age 50 and her maternal grandfather had lung cancer at age 70. The patient had her first child in  with IVF and then had a failed IVF treatment in  and became pregnant with twins through IVF in 2018. She felt a left breast upper outer quadrant mass around the beginning of 2018 when she was 28 weeks pregnant with her twins and she was sent for an ultrasound which showed a suspicious density over the palpable region measuring 1.1 x 0.8 x 1.5 cm with a suspicious lower left axillary lymph node. Ultrasound-guided core biopsy showed a high-grade invasive duct cancer which was ER/DC negative HER-2/mal 3+ by IHC. Lymph node FNA also showed metastatic cancer. Genetic testing with [employer] risk panel testing was performed at [place] showed a VUS in the T p53 gene. She was seen by Dr. Gibbs at the [place] [facility] and initially surgery was recommended. However, after discussing the case with Dr. Gibbs and the patient's gynecologist, Dr. Goode, it was decided for the patient to undergo neoadjuvant chemotherapy and she underwent AC through Dr. Brasher and then had a  delivering her twins and finished the AC chemotherapy and then Taxotere Herceptin and Perjeta postpartum. She had a follow-up MRI in 2019 showing a lesion in the right hepatic lobe which turned out to be hemangioma but there are also some enhancing focus is in the left breast retroareolar region and some clumped progressive enhancement in the right breast. Directed ultrasound showed a couple benign small fibroadenomas in the right breast and MRI biopsies were recommended in the right breast upper inner region and left breast retroareolar region. She decided to undergo bilateral total mastectomies and I also performed a localized sentinel lymph node biopsy in the left axilla and she had bilateral subpectoral direct implant reconstructions with AlloDerm on 2019. Final pathology showed 4 negative sentinel lymph nodes with the node with the clip just showing fibrosis. The cancer was completely gone from the left breast on final pathology only showing hyalinizing fibrosis. She did have another 6 nodes removed on the left side making a total of 10 negative nodes. This was a clinical prognostic stage IIA breast cancer prior to neoadjuvant chemotherapy with a complete pathologic response after neoadjuvant chemotherapy. The patient was seen by Dr. Brasher and received external beam radiation to Dr. Salas Sullivan at [facility] which started on 2019 and finished on 2019. She finished Herceptin through Dr. Brasher on 2020 and he attempted to put her on neratinib but she could not tolerate it. She had her right sided Port-A-Cath removed and did develop some hypertrophic scarring. She did have a right breast mastopexy and revision of her implants by Dr. Cyr in 2020.  On exam today I cannot feel any evidence of recurrence over the left implant.  The patient was reassured and should follow-up again in 6 months.

## 2021-07-09 NOTE — PHYSICAL EXAM
[Normocephalic] : normocephalic [Atraumatic] : atraumatic [EOMI] : extra ocular movement intact [Supple] : supple [No Supraclavicular Adenopathy] : no supraclavicular adenopathy [No Cervical Adenopathy] : no cervical adenopathy [Examined in the supine and seated position] : examined in the supine and seated position [No dominant masses] : no dominant masses in right breast  [No dominant masses] : no dominant masses left breast [Breast Mass Right Breast ___cm] : no masses [Breast Mass Left Breast ___cm] : no masses [No Axillary Lymphadenopathy] : no left axillary lymphadenopathy [No Edema] : no edema [No Rashes] : no rashes [No Ulceration] : no ulceration [de-identified] : On exam, the patient has the obvious bilateral total mastectomies with a left sentinel lymph node biopsy and had bilateral direct implant reconstructions in the subpectoral location.  She received radiation of the left implant and has some more tightness on the left side.  She has no suspicious findings over either implant.  Her cosmetic result improved after exchange of her implants in June 2020.  She did have a superficial burn in the upper outer aspect of the right breast and also developed a hypertrophic scar over the Port-A-Cath site.  She has no axillary, supraclavicular, or cervical adenopathy. [de-identified] : Status post total mastectomy with subpectoral direct to implant reconstruction with no suspicious findings. [de-identified] : Status post total mastectomy with subpectoral direct to implant reconstruction with no evidence of recurrence

## 2021-07-09 NOTE — REASON FOR VISIT
[Follow-Up: _____] : a [unfilled] follow-up visit [FreeTextEntry1] : The patient has a strong family history of breast cancer as well as ovarian cancer and was diagnosed with a high-grade invasive cancer which was ER/HI negative HER-2/mal 3+ which was noted towards the end of her last pregnancy with twins. Genetic testing was negative and she received neoadjuvant AC chemotherapy with Dr. Brasher towards the end of her pregnancy and then delivered her twins by  and finished the rest of her chemotherapy receiving Herceptin and Perjeta as well. MRI after neoadjuvant chemotherapy showed some other findings in the right breast which were felt to be benign however she decided to undergo bilateral total mastectomies and had a localized sentinel lymph node biopsy with bilateral subpectoral direct to implant reconstructions with AlloDerm in 2019. She had 4 negative sentinel lymph nodes and did have another 6 nodes removed for total of 10 negative nodes. This was a clinical pathologic stage IIA breast cancer prior to chemotherapy but with a complete pathologic response after neoadjuvant chemotherapy. She received external beam radiation over the left implant and finished Herceptin and could not tolerate neratinib. She had further revision on the right side for symmetry and comes in now for routine follow-up.

## 2021-07-09 NOTE — HISTORY OF PRESENT ILLNESS
[FreeTextEntry1] : The patient is a 36-year-old G4, P3 premenopausal white female of [place] descent. She underwent menarche at age 12 and had her first child at age 31. She has a strong family history with her paternal aunt who had lung cancer at age 54 and her paternal great grandmother who had breast cancer at age 84. Her maternal great grandmother had rectal cancer at age 82 and then breast cancer at age 84 and then ovarian cancer at age 55. She has a maternal great aunt who had breast cancer at age 50 and her maternal grandfather had lung cancer at age 70. The patient had her first child in  with IVF and then had a failed IVF treatment in  and became pregnant with twins through IVF in 2018. She felt a left breast upper outer quadrant mass around the beginning of 2018 when she was 28 weeks pregnant with her twins and she was sent for an ultrasound which showed a suspicious density over the palpable region measuring 1.1 x 0.8 x 1.5 cm with a suspicious lower left axillary lymph node. Ultrasound-guided core biopsy showed a high-grade invasive duct cancer which was ER/MN negative HER-2/mal 3+ by IHC. Lymph node FNA also showed metastatic cancer. Genetic testing with [employer] risk panel testing was performed at [place] showed a VUS in the T p53 gene. She was seen by Dr. Gibbs at the [place] [facility] and initially surgery was recommended. However, after discussing the case with Dr. Gibbs and the patient's gynecologist, Dr. Goode, it was decided for the patient to undergo neoadjuvant chemotherapy and she underwent AC through Dr. Brasher and then had a  delivering her twins and finished the AC chemotherapy and then Taxotere Herceptin and Perjeta postpartum. She had a follow-up MRI in 2019 showing a lesion in the right hepatic lobe which turned out to be hemangioma but there are also some enhancing focus is in the left breast retroareolar region and some clumped progressive enhancement in the right breast. Directed ultrasound showed a couple benign small fibroadenomas in the right breast and MRI biopsies were recommended in the right breast upper inner region and left breast retroareolar region. She decided to undergo bilateral total mastectomies and I also performed a localized sentinel lymph node biopsy in the left axilla and she had bilateral subpectoral direct implant reconstructions with AlloDerm on 2019. Final pathology showed 4 negative sentinel lymph nodes with the node with the clip just showing fibrosis. The cancer was completely gone from the left breast on final pathology only showing hyalinizing fibrosis. She did have another 6 nodes removed on the left side making a total of 10 negative nodes. This was a clinical prognostic stage IIA breast cancer prior to neoadjuvant chemotherapy with a complete pathologic response after neoadjuvant chemotherapy. The patient was seen by Dr. Brasher and received external beam radiation to Dr. Salas Sullivan at [facility] which started on 2019 and finished on 2019. She finished Herceptin through Dr. Brasher on 2020 and he attempted to put her on neratinib but she could not tolerate it. She had her right sided Port-A-Cath removed and did develop some hypertrophic scarring. She did have a right breast mastopexy and revision of her implants by Dr. Cyr in 2020 and comes in for routine follow-up.

## 2021-07-09 NOTE — PAST MEDICAL HISTORY
[Menstruating] : The patient is menstruating [Menarche Age ____] : age at menarche was [unfilled] [Total Preg ___] : G[unfilled] [Live Births ___] : P[unfilled]  [Age At Live Birth ___] : Age at live birth: [unfilled] [Definite ___ (Date)] : the last menstrual period was [unfilled] [History of Hormone Replacement Treatment] : has no history of hormone replacement treatment

## 2021-12-29 ENCOUNTER — APPOINTMENT (OUTPATIENT)
Dept: BREAST CENTER | Facility: CLINIC | Age: 36
End: 2021-12-29

## 2022-04-19 NOTE — ASSESSMENT
[FreeTextEntry1] : The patient is a 37-year-old G4, P3 premenopausal white female of [place] descent. She underwent menarche at age 12 and had her first child at age 31. She has a strong family history with her paternal aunt who had lung cancer at age 54 and her paternal great grandmother who had breast cancer at age 84. Her maternal great grandmother had rectal cancer at age 82 and then breast cancer at age 84 and then ovarian cancer at age 55. She has a maternal great aunt who had breast cancer at age 50 and her maternal grandfather had lung cancer at age 70. The patient had her first child in  with IVF and then had a failed IVF treatment in  and became pregnant with twins through IVF in 2018. She felt a left breast upper outer quadrant mass around the beginning of 2018 when she was 28 weeks pregnant with her twins and she was sent for an ultrasound which showed a suspicious density over the palpable region measuring 1.1 x 0.8 x 1.5 cm with a suspicious lower left axillary lymph node. Ultrasound-guided core biopsy showed a high-grade invasive duct cancer which was ER/CA negative HER-2/mal 3+ by IHC. Lymph node FNA also showed metastatic cancer. Genetic testing with [employer] risk panel testing was performed at [place] showed a VUS in the T p53 gene. She was seen by Dr. Gibbs at the [place] [facility] and initially surgery was recommended. However, after discussing the case with Dr. Gibbs and the patient's gynecologist, Dr. Goode, it was decided for the patient to undergo neoadjuvant chemotherapy and she underwent AC through Dr. Brasher and then had a  delivering her twins and finished the AC chemotherapy and then Taxotere Herceptin and Perjeta postpartum. She had a follow-up MRI in 2019 showing a lesion in the right hepatic lobe which turned out to be hemangioma but there were also some enhancing focuses in the left breast retroareolar region and some clumped progressive enhancement in the right breast. Directed ultrasound showed a couple benign small fibroadenomas in the right breast and MRI biopsies were recommended in the right breast upper inner region and left breast retroareolar region. She decided to undergo bilateral total mastectomies and I also performed a localized sentinel lymph node biopsy in the left axilla and she had bilateral subpectoral direct implant reconstructions with AlloDerm on 2019. Final pathology showed 4 negative sentinel lymph nodes with the node with the clip just showing fibrosis. The cancer was completely gone from the left breast on final pathology only showing hyalinizing fibrosis. She did have another 6 nodes removed on the left side making a total of 10 negative nodes. This was a clinical prognostic stage IIA breast cancer prior to neoadjuvant chemotherapy with a complete pathologic response after neoadjuvant chemotherapy. The patient was seen by Dr. Brasher and received external beam radiation through Dr. Salas Sullivan at [facility] which started on 2019 and finished on 2019. She finished Herceptin through Dr. Brasher on 2020 and he attempted to put her on neratinib but she could not tolerate it. She had her right sided Port-A-Cath removed and did develop some hypertrophic scarring. She did have a right breast mastopexy and revision of her implants by Dr. Cyr in 2020.  On exam today I cannot feel any evidence of recurrence over the left implant.  The patient was reassured and should follow-up again in 6 months.

## 2022-04-19 NOTE — REASON FOR VISIT
[Follow-Up: _____] : a [unfilled] follow-up visit [FreeTextEntry1] : The patient has a strong family history of breast cancer as well as ovarian cancer and was diagnosed with a high-grade invasive cancer which was ER/TN negative HER-2/mal 3+ which was noted towards the end of her last pregnancy with twins. Genetic testing was negative and she received neoadjuvant AC chemotherapy with Dr. Brasher towards the end of her pregnancy and then delivered her twins by  and finished the rest of her chemotherapy receiving Herceptin and Perjeta as well. MRI after neoadjuvant chemotherapy showed some other findings in the right breast which were felt to be benign however she decided to undergo bilateral total mastectomies and had a localized sentinel lymph node biopsy with bilateral subpectoral direct to implant reconstructions with AlloDerm in 2019. She had 4 negative sentinel lymph nodes and did have another 6 nodes removed for total of 10 negative nodes. This was a clinical pathologic stage IIA breast cancer prior to chemotherapy but with a complete pathologic response after neoadjuvant chemotherapy. She received external beam radiation over the left implant and finished Herceptin and could not tolerate neratinib. She had further revision on the right side for symmetry and comes in now for routine follow-up.

## 2022-04-19 NOTE — HISTORY OF PRESENT ILLNESS
[FreeTextEntry1] : The patient is a 37-year-old G4, P3 premenopausal white female of [place] descent. She underwent menarche at age 12 and had her first child at age 31. She has a strong family history with her paternal aunt who had lung cancer at age 54 and her paternal great grandmother who had breast cancer at age 84. Her maternal great grandmother had rectal cancer at age 82 and then breast cancer at age 84 and then ovarian cancer at age 55. She has a maternal great aunt who had breast cancer at age 50 and her maternal grandfather had lung cancer at age 70. The patient had her first child in  with IVF and then had a failed IVF treatment in  and became pregnant with twins through IVF in 2018. She felt a left breast upper outer quadrant mass around the beginning of 2018 when she was 28 weeks pregnant with her twins and she was sent for an ultrasound which showed a suspicious density over the palpable region measuring 1.1 x 0.8 x 1.5 cm with a suspicious lower left axillary lymph node. Ultrasound-guided core biopsy showed a high-grade invasive duct cancer which was ER/CT negative HER-2/mal 3+ by IHC. Lymph node FNA also showed metastatic cancer. Genetic testing with [employer] risk panel testing was performed at [place] showed a VUS in the T p53 gene. She was seen by Dr. Gibbs at the [place] [facility] and initially surgery was recommended. However, after discussing the case with Dr. Gibbs and the patient's gynecologist, Dr. Goode, it was decided for the patient to undergo neoadjuvant chemotherapy and she underwent AC through Dr. Brasher and then had a  delivering her twins and finished the AC chemotherapy and then Taxotere Herceptin and Perjeta postpartum. She had a follow-up MRI in 2019 showing a lesion in the right hepatic lobe which turned out to be hemangioma but there are also some enhancing focus is in the left breast retroareolar region and some clumped progressive enhancement in the right breast. Directed ultrasound showed a couple benign small fibroadenomas in the right breast and MRI biopsies were recommended in the right breast upper inner region and left breast retroareolar region. She decided to undergo bilateral total mastectomies and I also performed a localized sentinel lymph node biopsy in the left axilla and she had bilateral subpectoral direct implant reconstructions with AlloDerm on 2019. Final pathology showed 4 negative sentinel lymph nodes with the node with the clip just showing fibrosis. The cancer was completely gone from the left breast on final pathology only showing hyalinizing fibrosis. She did have another 6 nodes removed on the left side making a total of 10 negative nodes. This was a clinical prognostic stage IIA breast cancer prior to neoadjuvant chemotherapy with a complete pathologic response after neoadjuvant chemotherapy. The patient was seen by Dr. Brasher and received external beam radiation to Dr. Salas Sullivan at [facility] which started on 2019 and finished on 2019. She finished Herceptin through Dr. Brasher on 2020 and he attempted to put her on neratinib but she could not tolerate it. She had her right sided Port-A-Cath removed and did develop some hypertrophic scarring. She did have a right breast mastopexy and revision of her implants by Dr. Cyr in 2020 and comes in for routine follow-up.

## 2022-04-19 NOTE — PHYSICAL EXAM
[Normocephalic] : normocephalic [Atraumatic] : atraumatic [EOMI] : extra ocular movement intact [Supple] : supple [No Supraclavicular Adenopathy] : no supraclavicular adenopathy [No Cervical Adenopathy] : no cervical adenopathy [Examined in the supine and seated position] : examined in the supine and seated position [No dominant masses] : no dominant masses in right breast  [No dominant masses] : no dominant masses left breast [Breast Mass Right Breast ___cm] : no masses [Breast Mass Left Breast ___cm] : no masses [No Axillary Lymphadenopathy] : no left axillary lymphadenopathy [No Edema] : no edema [No Rashes] : no rashes [No Ulceration] : no ulceration [de-identified] : On exam, the patient has the obvious bilateral total mastectomies with a left sentinel lymph node biopsy and had bilateral direct implant reconstructions in the subpectoral location.  She received radiation of the left implant and has some more tightness on the left side.  She has no suspicious findings over either implant.  Her cosmetic result improved after exchange of her implants in June 2020.  She did have a superficial burn in the upper outer aspect of the right breast and also developed a hypertrophic scar over the Port-A-Cath site.  She has no axillary, supraclavicular, or cervical adenopathy. [de-identified] : Status post total mastectomy with subpectoral direct to implant reconstruction with no suspicious findings. [de-identified] : Status post total mastectomy with subpectoral direct to implant reconstruction with no evidence of recurrence

## 2022-04-19 NOTE — PAST MEDICAL HISTORY
[Menstruating] : The patient is menstruating [Menarche Age ____] : age at menarche was [unfilled] [History of Hormone Replacement Treatment] : has no history of hormone replacement treatment [Definite ___ (Date)] : the last menstrual period was [unfilled] [Total Preg ___] : G[unfilled] [Live Births ___] : P[unfilled]  [Age At Live Birth ___] : Age at live birth: [unfilled]

## 2022-04-21 ENCOUNTER — APPOINTMENT (OUTPATIENT)
Dept: BREAST CENTER | Facility: CLINIC | Age: 37
End: 2022-04-21
Payer: COMMERCIAL

## 2022-06-24 ENCOUNTER — APPOINTMENT (OUTPATIENT)
Dept: BREAST CENTER | Facility: CLINIC | Age: 37
End: 2022-06-24
Payer: COMMERCIAL

## 2022-06-24 VITALS
DIASTOLIC BLOOD PRESSURE: 77 MMHG | WEIGHT: 125.7 LBS | OXYGEN SATURATION: 99 % | HEIGHT: 65 IN | BODY MASS INDEX: 20.94 KG/M2 | SYSTOLIC BLOOD PRESSURE: 106 MMHG | HEART RATE: 67 BPM

## 2022-06-24 DIAGNOSIS — N63.20 UNSPECIFIED LUMP IN THE LEFT BREAST, UNSPECIFIED QUADRANT: ICD-10-CM

## 2022-06-24 PROCEDURE — 99213 OFFICE O/P EST LOW 20 MIN: CPT

## 2022-06-24 NOTE — REASON FOR VISIT
[Follow-Up: _____] : a [unfilled] follow-up visit [FreeTextEntry1] : The patient has a strong family history of breast cancer as well as ovarian cancer and was diagnosed with a high-grade invasive cancer which was ER/UT negative HER-2/mal 3+ which was noted towards the end of her last pregnancy with twins. Genetic testing was negative and she received neoadjuvant AC chemotherapy with Dr. Brasher towards the end of her pregnancy and then delivered her twins by  and finished the rest of her chemotherapy receiving Herceptin and Perjeta as well. MRI after neoadjuvant chemotherapy showed some other findings in the right breast which were felt to be benign however she decided to undergo bilateral total mastectomies and had a localized sentinel lymph node biopsy with bilateral subpectoral direct to implant reconstructions with AlloDerm in 2019. She had 4 negative sentinel lymph nodes and did have another 6 nodes removed for total of 10 negative nodes. This was a clinical pathologic stage IIA breast cancer prior to chemotherapy but with a complete pathologic response after neoadjuvant chemotherapy. She received external beam radiation over the left implant and finished Herceptin and could not tolerate neratinib. She had further revision on the right side for symmetry and comes in now for routine follow-up.

## 2022-06-24 NOTE — PAST MEDICAL HISTORY
[Menstruating] : The patient is menstruating [Menarche Age ____] : age at menarche was [unfilled] [Definite ___ (Date)] : the last menstrual period was [unfilled] [Total Preg ___] : G[unfilled] [Live Births ___] : P[unfilled]  [Age At Live Birth ___] : Age at live birth: [unfilled] [History of Hormone Replacement Treatment] : has no history of hormone replacement treatment

## 2022-06-24 NOTE — ASSESSMENT
[FreeTextEntry1] : The patient is a 37-year-old G4, P3 premenopausal white female of [place] descent. She underwent menarche at age 12 and had her first child at age 31. She has a strong family history with her paternal aunt who had lung cancer at age 54 and her paternal great grandmother who had breast cancer at age 84. Her maternal great grandmother had rectal cancer at age 82 and then breast cancer at age 84 and then ovarian cancer at age 55. She has a maternal great aunt who had breast cancer at age 50 and her maternal grandfather had lung cancer at age 70. The patient had her first child in  with IVF and then had a failed IVF treatment in  and became pregnant with twins through IVF in 2018. She felt a left breast upper outer quadrant mass around the beginning of 2018 when she was 28 weeks pregnant with her twins and she was sent for an ultrasound which showed a suspicious density over the palpable region measuring 1.1 x 0.8 x 1.5 cm with a suspicious lower left axillary lymph node. Ultrasound-guided core biopsy showed a high-grade invasive duct cancer which was ER/AL negative HER-2/mal 3+ by IHC. Lymph node FNA also showed metastatic cancer. Genetic testing with [employer] risk panel testing was performed at [place] showed a VUS in the T p53 gene. She was seen by Dr. Gibbs at the [place] [facility] and initially surgery was recommended. However, after discussing the case with Dr. Gibbs and the patient's gynecologist, Dr. Goode, it was decided for the patient to undergo neoadjuvant chemotherapy and she underwent AC through Dr. Brasher and then had a  delivering her twins and finished the AC chemotherapy and then Taxotere Herceptin and Perjeta postpartum. She had a follow-up MRI in 2019 showing a lesion in the right hepatic lobe which turned out to be hemangioma but there were also some enhancing focuses in the left breast retroareolar region and some clumped progressive enhancement in the right breast. Directed ultrasound showed a couple benign small fibroadenomas in the right breast and MRI biopsies were recommended in the right breast upper inner region and left breast retroareolar region. She decided to undergo bilateral total mastectomies and I also performed a localized sentinel lymph node biopsy in the left axilla and she had bilateral subpectoral direct implant reconstructions with AlloDerm on 2019. Final pathology showed 4 negative sentinel lymph nodes with the node with the clip just showing fibrosis. The cancer was completely gone from the left breast on final pathology only showing hyalinizing fibrosis. She did have another 6 nodes removed on the left side making a total of 10 negative nodes. This was a clinical prognostic stage IIA breast cancer prior to neoadjuvant chemotherapy with a complete pathologic response after neoadjuvant chemotherapy. The patient was seen by Dr. Brasher and received external beam radiation through Dr. Salas Sullivan at [facility] which started on 2019 and finished on 2019. She finished Herceptin through Dr. Brasher on 2020 and he attempted to put her on neratinib but she could not tolerate it. She had her right sided Port-A-Cath removed and did develop some hypertrophic scarring. She did have a right breast mastopexy and revision of her implants by Dr. Cyr in 2020.  On exam today I cannot feel any evidence of recurrence over the left implant.  She never underwent nipple reconstruction.  She did develop a palpable density felt over the lower aspect of the left implant and ended up undergoing an ultrasound and ultrasound-guided core biopsy at [facility] performed on April 15, 2022 which is came back as fat necrosis which is likely due from her prior fat grafting.   She also lost about 40 pounds recently.  The patient was reassured and should follow-up again in 6 months.  She should get another follow-up diagnostic left breast ultrasound around 2022 just for typical follow-up after her prior biopsy.

## 2022-06-24 NOTE — PHYSICAL EXAM
[Normocephalic] : normocephalic [Atraumatic] : atraumatic [EOMI] : extra ocular movement intact [Supple] : supple [No Supraclavicular Adenopathy] : no supraclavicular adenopathy [No Cervical Adenopathy] : no cervical adenopathy [Examined in the supine and seated position] : examined in the supine and seated position [No dominant masses] : no dominant masses in right breast  [No dominant masses] : no dominant masses left breast [Breast Mass Right Breast ___cm] : no masses [Breast Mass Left Breast ___cm] : no masses [No Axillary Lymphadenopathy] : no left axillary lymphadenopathy [No Edema] : no edema [No Rashes] : no rashes [No Ulceration] : no ulceration [de-identified] : On exam, the patient has the obvious bilateral total mastectomies with a left sentinel lymph node biopsy and had bilateral direct to implant reconstructions in the subpectoral location.  She received radiation of the left implant and has some more tightness on the left side but it is acceptable.  She has no suspicious findings over either implant but did have a biopsy on the inferior aspect over the left implant in April 2022 showing fat necrosis.  Her cosmetic result improved after exchange of her implants in June 2020.  She did have a superficial burn in the upper outer aspect of the right breast and also developed a hypertrophic scar over the Port-A-Cath site.  She has no axillary, supraclavicular, or cervical adenopathy. [de-identified] : Status post total mastectomy with subpectoral direct to implant reconstruction with no suspicious findings. [de-identified] : Status post total mastectomy with subpectoral direct to implant reconstruction with no evidence of recurrence

## 2022-06-24 NOTE — HISTORY OF PRESENT ILLNESS
[FreeTextEntry1] : The patient is a 37-year-old G4, P3 premenopausal white female of [place] descent. She underwent menarche at age 12 and had her first child at age 31. She has a strong family history with her paternal aunt who had lung cancer at age 54 and her paternal great grandmother who had breast cancer at age 84. Her maternal great grandmother had rectal cancer at age 82 and then breast cancer at age 84 and then ovarian cancer at age 55. She has a maternal great aunt who had breast cancer at age 50 and her maternal grandfather had lung cancer at age 70. The patient had her first child in  with IVF and then had a failed IVF treatment in  and became pregnant with twins through IVF in 2018. She felt a left breast upper outer quadrant mass around the beginning of 2018 when she was 28 weeks pregnant with her twins and she was sent for an ultrasound which showed a suspicious density over the palpable region measuring 1.1 x 0.8 x 1.5 cm with a suspicious lower left axillary lymph node. Ultrasound-guided core biopsy showed a high-grade invasive duct cancer which was ER/TN negative HER-2/mal 3+ by IHC. Lymph node FNA also showed metastatic cancer. Genetic testing with [employer] risk panel testing was performed at [place] showed a VUS in the T p53 gene. She was seen by Dr. Gibbs at the [place] [facility] and initially surgery was recommended. However, after discussing the case with Dr. Gibbs and the patient's gynecologist, Dr. Goode, it was decided for the patient to undergo neoadjuvant chemotherapy and she underwent AC through Dr. Brasher and then had a  delivering her twins and finished the AC chemotherapy and then Taxotere Herceptin and Perjeta postpartum. She had a follow-up MRI in 2019 showing a lesion in the right hepatic lobe which turned out to be hemangioma but there are also some enhancing focus is in the left breast retroareolar region and some clumped progressive enhancement in the right breast. Directed ultrasound showed a couple benign small fibroadenomas in the right breast and MRI biopsies were recommended in the right breast upper inner region and left breast retroareolar region. She decided to undergo bilateral total mastectomies and I also performed a localized sentinel lymph node biopsy in the left axilla and she had bilateral subpectoral direct implant reconstructions with AlloDerm on 2019. Final pathology showed 4 negative sentinel lymph nodes with the node with the clip just showing fibrosis. The cancer was completely gone from the left breast on final pathology only showing hyalinizing fibrosis. She did have another 6 nodes removed on the left side making a total of 10 negative nodes. This was a clinical prognostic stage IIA breast cancer prior to neoadjuvant chemotherapy with a complete pathologic response after neoadjuvant chemotherapy. The patient was seen by Dr. Brasher and received external beam radiation to Dr. Salas Sullivan at [facility] which started on 2019 and finished on 2019. She finished Herceptin through Dr. Brasher on 2020 and he attempted to put her on neratinib but she could not tolerate it. She had her right sided Port-A-Cath removed and did develop some hypertrophic scarring. She did have a right breast mastopexy and revision of her implants by Dr. Cyr in 2020.  She was found to have a palpable density over the inferior aspect of the left implant and underwent an ultrasound and ultrasound-guided core biopsy on April 15, 2022 ordered by Dr. Brasher at [facility] which showed fat necrosis.  She comes in now for routine follow-up.

## 2022-12-12 NOTE — PHYSICAL EXAM
[Normocephalic] : normocephalic [Atraumatic] : atraumatic [EOMI] : extra ocular movement intact [Supple] : supple [No Supraclavicular Adenopathy] : no supraclavicular adenopathy [No Cervical Adenopathy] : no cervical adenopathy [Examined in the supine and seated position] : examined in the supine and seated position [No dominant masses] : no dominant masses in right breast  [No dominant masses] : no dominant masses left breast [Breast Mass Right Breast ___cm] : no masses [Breast Mass Left Breast ___cm] : no masses [No Axillary Lymphadenopathy] : no left axillary lymphadenopathy [No Edema] : no edema [No Rashes] : no rashes [No Ulceration] : no ulceration [de-identified] : On exam, the patient has the obvious bilateral total mastectomies with a left sentinel lymph node biopsy and had bilateral direct to implant reconstructions in the subpectoral location.  She received radiation of the left implant and has some more tightness on the left side but it is acceptable.  She has no suspicious findings over either implant but did have a biopsy on the inferior aspect over the left implant in April 2022 showing fat necrosis.  Her cosmetic result improved after exchange of her implants in June 2020.  She did have a superficial burn in the upper outer aspect of the right breast and also developed a hypertrophic scar over the Port-A-Cath site.  She has no axillary, supraclavicular, or cervical adenopathy. [de-identified] : Status post total mastectomy with subpectoral direct to implant reconstruction with no suspicious findings. [de-identified] : Status post total mastectomy with subpectoral direct to implant reconstruction with no evidence of recurrence

## 2022-12-12 NOTE — REASON FOR VISIT
[Follow-Up: _____] : a [unfilled] follow-up visit [FreeTextEntry1] : The patient has a strong family history of breast cancer as well as ovarian cancer and was diagnosed with a high-grade invasive cancer which was ER/MA negative HER-2/mal 3+ which was noted towards the end of her last pregnancy with twins. Genetic testing was negative and she received neoadjuvant AC chemotherapy with Dr. Brasher towards the end of her pregnancy and then delivered her twins by  and finished the rest of her chemotherapy receiving Herceptin and Perjeta as well. MRI after neoadjuvant chemotherapy showed some other findings in the right breast which were felt to be benign however she decided to undergo bilateral total mastectomies and had a localized sentinel lymph node biopsy with bilateral subpectoral direct to implant reconstructions with AlloDerm in 2019. She had 4 negative sentinel lymph nodes and did have another 6 nodes removed for total of 10 negative nodes. This was a clinical pathologic stage IIA breast cancer prior to chemotherapy but with a complete pathologic response after neoadjuvant chemotherapy. She received external beam radiation over the left implant and finished Herceptin and could not tolerate neratinib. She had further revision on the right side for symmetry and comes in now for routine follow-up.

## 2022-12-12 NOTE — HISTORY OF PRESENT ILLNESS
[FreeTextEntry1] : The patient is a 37-year-old G4, P3 premenopausal white female of [place] descent. She underwent menarche at age 12 and had her first child at age 31. She has a strong family history with her paternal aunt who had lung cancer at age 54 and her paternal great grandmother who had breast cancer at age 84. Her maternal great grandmother had rectal cancer at age 82 and then breast cancer at age 84 and then ovarian cancer at age 55. She has a maternal great aunt who had breast cancer at age 50 and her maternal grandfather had lung cancer at age 70. The patient had her first child in  with IVF and then had a failed IVF treatment in  and became pregnant with twins through IVF in 2018. She felt a left breast upper outer quadrant mass around the beginning of 2018 when she was 28 weeks pregnant with her twins and she was sent for an ultrasound which showed a suspicious density over the palpable region measuring 1.1 x 0.8 x 1.5 cm with a suspicious lower left axillary lymph node. Ultrasound-guided core biopsy showed a high-grade invasive duct cancer which was ER/DE negative HER-2/mal 3+ by IHC. Lymph node FNA also showed metastatic cancer. Genetic testing with [employer] risk panel testing was performed at [place] showed a VUS in the T p53 gene. She was seen by Dr. Gibbs at the [place] [facility] of [facility] and initially surgery was recommended. However, after discussing the case with Dr. Gibbs and the patient's gynecologist, Dr. Goode, it was decided for the patient to undergo neoadjuvant chemotherapy and she underwent AC through Dr. Brasher and then had a  delivering her twins and finished the AC chemotherapy and then Taxotere Herceptin and Perjeta postpartum. She had a follow-up MRI in 2019 showing a lesion in the right hepatic lobe which turned out to be hemangioma but there are also some enhancing focus is in the left breast retroareolar region and some clumped progressive enhancement in the right breast. Directed ultrasound showed a couple benign small fibroadenomas in the right breast and MRI biopsies were recommended in the right breast upper inner region and left breast retroareolar region. She decided to undergo bilateral total mastectomies and I also performed a localized sentinel lymph node biopsy in the left axilla and she had bilateral subpectoral direct implant reconstructions with AlloDerm on 2019. Final pathology showed 4 negative sentinel lymph nodes with the node with the clip just showing fibrosis. The cancer was completely gone from the left breast on final pathology only showing hyalinizing fibrosis. She did have another 6 nodes removed on the left side making a total of 10 negative nodes. This was a clinical prognostic stage IIA breast cancer prior to neoadjuvant chemotherapy with a complete pathologic response after neoadjuvant chemotherapy. The patient was seen by Dr. Brasher and received external beam radiation to Dr. Salas Sullivan at [facility] which started on 2019 and finished on 2019. She finished Herceptin through Dr. Brasher on 2020 and he attempted to put her on neratinib but she could not tolerate it. She had her right sided Port-A-Cath removed and did develop some hypertrophic scarring. She did have a right breast mastopexy and revision of her implants by Dr. Cyr in 2020.  She was found to have a palpable density over the inferior aspect of the left implant and underwent an ultrasound and ultrasound-guided core biopsy on April 15, 2022 ordered by Dr. Brasher at [facility] which showed fat necrosis.  She comes in now for routine follow-up.

## 2022-12-19 ENCOUNTER — APPOINTMENT (OUTPATIENT)
Dept: BREAST CENTER | Facility: CLINIC | Age: 37
End: 2022-12-19
Payer: COMMERCIAL

## 2023-02-01 ENCOUNTER — APPOINTMENT (OUTPATIENT)
Dept: BREAST CENTER | Facility: CLINIC | Age: 38
End: 2023-02-01
Payer: COMMERCIAL

## 2023-02-01 VITALS
HEART RATE: 75 BPM | WEIGHT: 123 LBS | BODY MASS INDEX: 20.49 KG/M2 | HEIGHT: 65 IN | OXYGEN SATURATION: 99 % | TEMPERATURE: 98.5 F

## 2023-02-01 DIAGNOSIS — R92.2 INCONCLUSIVE MAMMOGRAM: ICD-10-CM

## 2023-02-01 PROCEDURE — 99213 OFFICE O/P EST LOW 20 MIN: CPT

## 2023-02-01 NOTE — PHYSICAL EXAM
[Normocephalic] : normocephalic [Atraumatic] : atraumatic [EOMI] : extra ocular movement intact [Supple] : supple [No Supraclavicular Adenopathy] : no supraclavicular adenopathy [No Cervical Adenopathy] : no cervical adenopathy [Examined in the supine and seated position] : examined in the supine and seated position [No dominant masses] : no dominant masses in right breast  [No dominant masses] : no dominant masses left breast [Breast Mass Right Breast ___cm] : no masses [Breast Mass Left Breast ___cm] : no masses [No Axillary Lymphadenopathy] : no left axillary lymphadenopathy [No Edema] : no edema [No Rashes] : no rashes [No Ulceration] : no ulceration [de-identified] : On exam, the patient has the obvious bilateral total mastectomies with a left sentinel lymph node biopsy and had bilateral direct to implant reconstructions in the subpectoral location.  She received radiation of the left implant and has some more tightness on the left side but it is acceptable.  She has no suspicious findings over either implant but did have a biopsy on the inferior aspect over the left implant in April 2022 showing fat necrosis.  Her cosmetic result improved after exchange of her implants in June 2020.  She did have a superficial burn in the upper outer aspect of the right breast and also developed a hypertrophic scar over the Port-A-Cath site.  She has no axillary, supraclavicular, or cervical adenopathy. [de-identified] : Status post total mastectomy with subpectoral direct to implant reconstruction with no suspicious findings. [de-identified] : Status post total mastectomy with subpectoral direct to implant reconstruction with no evidence of recurrence

## 2023-02-01 NOTE — HISTORY OF PRESENT ILLNESS
[FreeTextEntry1] : The patient is a 37-year-old G4, P3 premenopausal white female of [place] descent. She underwent menarche at age 12 and had her first child at age 31. She has a strong family history with her paternal aunt who had lung cancer at age 54 and her paternal great grandmother who had breast cancer at age 84. Her maternal great grandmother had rectal cancer at age 82 and then breast cancer at age 84 and then ovarian cancer at age 55. She has a maternal great aunt who had breast cancer at age 50 and her maternal grandfather had lung cancer at age 70. The patient had her first child in  with IVF and then had a failed IVF treatment in  and became pregnant with twins through IVF in 2018. She felt a left breast upper outer quadrant mass around the beginning of 2018 when she was 28 weeks pregnant with her twins and she was sent for an ultrasound which showed a suspicious density over the palpable region measuring 1.1 x 0.8 x 1.5 cm with a suspicious lower left axillary lymph node. Ultrasound-guided core biopsy showed a high-grade invasive duct cancer which was ER/OR negative HER-2/mal 3+ by IHC. Lymph node FNA also showed metastatic cancer. Genetic testing with [employer] risk panel testing was performed at [place] showed a VUS in the T p53 gene. She was seen by Dr. Gibbs at the [place] [facility] and initially surgery was recommended. However, after discussing the case with Dr. Gibbs and the patient's gynecologist, Dr. Goode, it was decided for the patient to undergo neoadjuvant chemotherapy and she underwent AC through Dr. Brasher and then had a  delivering her twins and finished the AC chemotherapy and then Taxotere Herceptin and Perjeta postpartum. She had a follow-up MRI in 2019 showing a lesion in the right hepatic lobe which turned out to be hemangioma but there are also some enhancing focus is in the left breast retroareolar region and some clumped progressive enhancement in the right breast. Directed ultrasound showed a couple benign small fibroadenomas in the right breast and MRI biopsies were recommended in the right breast upper inner region and left breast retroareolar region. She decided to undergo bilateral total mastectomies and I also performed a localized sentinel lymph node biopsy in the left axilla and she had bilateral subpectoral direct implant reconstructions with AlloDerm on 2019. Final pathology showed 4 negative sentinel lymph nodes with the node with the clip just showing fibrosis. The cancer was completely gone from the left breast on final pathology only showing hyalinizing fibrosis. She did have another 6 nodes removed on the left side making a total of 10 negative nodes. This was a clinical prognostic stage IIA breast cancer prior to neoadjuvant chemotherapy with a complete pathologic response after neoadjuvant chemotherapy. The patient was seen by Dr. Brasher and received external beam radiation to Dr. Salas Sullivan at [facility] which started on 2019 and finished on 2019. She finished Herceptin through Dr. Brasher on 2020 and he attempted to put her on neratinib but she could not tolerate it. She had her right sided Port-A-Cath removed and did develop some hypertrophic scarring. She did have a right breast mastopexy and revision of her implants by Dr. Cyr in 2020.  She was found to have a palpable density over the inferior aspect of the left implant and underwent an ultrasound and ultrasound-guided core biopsy on April 15, 2022 ordered by Dr. Brasher at [facility] which showed fat necrosis.  She comes in now for routine follow-up.

## 2023-02-01 NOTE — REASON FOR VISIT
[Follow-Up: _____] : a [unfilled] follow-up visit [FreeTextEntry1] : The patient has a strong family history of breast cancer as well as ovarian cancer and was diagnosed with a high-grade invasive cancer which was ER/PA negative HER-2/mal 3+ which was noted towards the end of her last pregnancy with twins. Genetic testing was negative and she received neoadjuvant AC chemotherapy with Dr. Brasher towards the end of her pregnancy and then delivered her twins by  and finished the rest of her chemotherapy receiving Herceptin and Perjeta as well. MRI after neoadjuvant chemotherapy showed some other findings in the right breast which were felt to be benign however she decided to undergo bilateral total mastectomies and had a localized sentinel lymph node biopsy with bilateral subpectoral direct to implant reconstructions with AlloDerm in 2019. She had 4 negative sentinel lymph nodes and did have another 6 nodes removed for total of 10 negative nodes. This was a clinical pathologic stage IIA breast cancer prior to chemotherapy but with a complete pathologic response after neoadjuvant chemotherapy. She received external beam radiation over the left implant and finished Herceptin and could not tolerate neratinib. She had further revision on the right side for symmetry and comes in now for routine follow-up.

## 2023-02-01 NOTE — ASSESSMENT
[FreeTextEntry1] : The patient is a 37-year-old G4, P3 premenopausal white female of [place] descent. She underwent menarche at age 12 and had her first child at age 31. She has a strong family history with her paternal aunt who had lung cancer at age 54 and her paternal great grandmother who had breast cancer at age 84. Her maternal great grandmother had rectal cancer at age 82 and then breast cancer at age 84 and then ovarian cancer at age 55. She has a maternal great aunt who had breast cancer at age 50 and her maternal grandfather had lung cancer at age 70. The patient had her first child in  with IVF and then had a failed IVF treatment in  and became pregnant with twins through IVF in 2018. She felt a left breast upper outer quadrant mass around the beginning of 2018 when she was 28 weeks pregnant with her twins and she was sent for an ultrasound which showed a suspicious density over the palpable region measuring 1.1 x 0.8 x 1.5 cm with a suspicious lower left axillary lymph node. Ultrasound-guided core biopsy showed a high-grade invasive duct cancer which was ER/WA negative HER-2/mal 3+ by IHC. Lymph node FNA also showed metastatic cancer. Genetic testing with [employer] risk panel testing was performed at [place] showed a VUS in the T p53 gene. She was seen by Dr. Gibbs at the [place] [facility] of [facility] and initially surgery was recommended. However, after discussing the case with Dr. Gibbs and the patient's gynecologist, Dr. Goode, it was decided for the patient to undergo neoadjuvant chemotherapy and she underwent AC through Dr. Brasher and then had a  delivering her twins and finished the AC chemotherapy and then Taxotere Herceptin and Perjeta postpartum. She had a follow-up MRI in 2019 showing a lesion in the right hepatic lobe which turned out to be hemangioma but there were also some enhancing focuses in the left breast retroareolar region and some clumped progressive enhancement in the right breast. Directed ultrasound showed a couple benign small fibroadenomas in the right breast and MRI biopsies were recommended in the right breast upper inner region and left breast retroareolar region. She decided to undergo bilateral total mastectomies and I also performed a localized sentinel lymph node biopsy in the left axilla and she had bilateral subpectoral direct to implant reconstructions with AlloDerm on 2019. Final pathology showed 4 negative sentinel lymph nodes with the node with the clip just showing fibrosis. The cancer was completely gone from the left breast on final pathology only showing hyalinizing fibrosis. She did have another 6 nodes removed on the left side making a total of 10 negative nodes. This was a clinical prognostic stage IIA breast cancer prior to neoadjuvant chemotherapy with a complete pathologic response after neoadjuvant chemotherapy. The patient was seen by Dr. Brasher and received external beam radiation through Dr. Salas Sullivan at [facility] which started on 2019 and finished on 2019. She finished Herceptin through Dr. Brasher on 2020 and he attempted to put her on neratinib but she could not tolerate it. She had her right sided Port-A-Cath removed and did develop some hypertrophic scarring. She did have a right breast mastopexy and revision of her implants by Dr. Cyr in 2020.  On exam today, I cannot feel any evidence of recurrence over the left implant.  She never underwent nipple reconstructions.  She did develop a palpable density felt over the lower aspect of the left implant and ended up undergoing an ultrasound and ultrasound-guided core biopsy at [facility] performed on April 15, 2022 which came back as fat necrosis which is likely due from her prior fat grafting.   She also lost about 40 pounds.  She is due for a follow up diagnostic left breast ultrasound and she was given a prescription and we will get this scheduled at [place] [facility].  The patient was reassured and should follow-up again in 6 months in 2023. She continues to follow up with Dr. Brasher.

## 2023-07-22 ENCOUNTER — NON-APPOINTMENT (OUTPATIENT)
Age: 38
End: 2023-07-22

## 2023-07-22 NOTE — REASON FOR VISIT
[Follow-Up: _____] : a [unfilled] follow-up visit [FreeTextEntry1] : The patient has a strong family history of breast cancer as well as ovarian cancer and was diagnosed with a high-grade invasive cancer which was ER/OR negative HER-2/mal 3+ which was noted towards the end of her last pregnancy with twins. Genetic testing was negative and she received neoadjuvant AC chemotherapy with Dr. Brasher towards the end of her pregnancy and then delivered her twins by  and finished the rest of her chemotherapy receiving Herceptin and Perjeta as well. MRI after neoadjuvant chemotherapy showed some other findings in the right breast which were felt to be benign however she decided to undergo bilateral total mastectomies and had a localized sentinel lymph node biopsy with bilateral subpectoral direct to implant reconstructions with AlloDerm in 2019. She had 4 negative sentinel lymph nodes and did have another 6 nodes removed for total of 10 negative nodes. This was a clinical pathologic stage IIA breast cancer prior to chemotherapy but with a complete pathologic response after neoadjuvant chemotherapy. She received external beam radiation over the left implant and finished Herceptin and could not tolerate neratinib. She had further revision on the right side for symmetry and comes in now for routine follow-up.

## 2023-07-22 NOTE — ASSESSMENT
[FreeTextEntry1] : The patient is a 38-year-old G4, P3 premenopausal white female of [place] descent. She underwent menarche at age 12 and had her first child at age 31. She has a strong family history with her paternal aunt who had lung cancer at age 54 and her paternal great grandmother who had breast cancer at age 84. Her maternal great grandmother had rectal cancer at age 82 and then breast cancer at age 84 and then ovarian cancer at age 55. She has a maternal great aunt who had breast cancer at age 50 and her maternal grandfather had lung cancer at age 70. The patient had her first child in  with IVF and then had a failed IVF treatment in  and became pregnant with twins through IVF in 2018. She felt a left breast upper outer quadrant mass around the beginning of 2018 when she was 28 weeks pregnant with her twins and she was sent for an ultrasound which showed a suspicious density over the palpable region measuring 1.1 x 0.8 x 1.5 cm with a suspicious lower left axillary lymph node. Ultrasound-guided core biopsy showed a high-grade invasive duct cancer which was ER/FL negative HER-2/mal 3+ by IHC. Lymph node FNA also showed metastatic cancer. Genetic testing with [employer] risk panel testing was performed at [place] showed a VUS in the T p53 gene. She was seen by Dr. Gibbs at the [place] [facility] and initially surgery was recommended. However, after discussing the case with Dr. Gibbs and the patient's gynecologist, Dr. Goode, it was decided for the patient to undergo neoadjuvant chemotherapy and she underwent AC through Dr. Brasher and then had a  delivering her twins and finished the AC chemotherapy and then Taxotere Herceptin and Perjeta postpartum. She had a follow-up MRI in 2019 showing a lesion in the right hepatic lobe which turned out to be hemangioma but there were also some enhancing focuses in the left breast retroareolar region and some clumped progressive enhancement in the right breast. Directed ultrasound showed a couple benign small fibroadenomas in the right breast and MRI biopsies were recommended in the right breast upper inner region and left breast retroareolar region. She decided to undergo bilateral total mastectomies and I also performed a localized sentinel lymph node biopsy in the left axilla and she had bilateral subpectoral direct to implant reconstructions with AlloDerm on 2019. Final pathology showed 4 negative sentinel lymph nodes with the node with the clip just showing fibrosis. The cancer was completely gone from the left breast on final pathology only showing hyalinizing fibrosis. She did have another 6 nodes removed on the left side making a total of 10 negative nodes. This was a clinical prognostic stage IIA breast cancer prior to neoadjuvant chemotherapy with a complete pathologic response after neoadjuvant chemotherapy. The patient was seen by Dr. Brasher and received external beam radiation through Dr. Salas Sullivan at [facility] which started on 2019 and finished on 2019. She finished Herceptin through Dr. Brasher on 2020 and he attempted to put her on neratinib but she could not tolerate it. She had her right sided Port-A-Cath removed and did develop some hypertrophic scarring. She did have a right breast mastopexy and revision of her implants by Dr. Cyr in 2020.  On exam today, I cannot feel any evidence of recurrence over the left implant.  She never underwent nipple reconstructions.  She did develop a palpable density felt over the lower aspect of the left implant and ended up undergoing an ultrasound and ultrasound-guided core biopsy at [facility] performed on April 15, 2022 which came back as fat necrosis which is likely due from her prior fat grafting.   She also lost about 40 pounds.  She underwent a follow up diagnostic left breast ultrasound at [place] [facility] on ????? which showed ????????.  The patient was reassured and should follow-up again in 6 months in 2024. She continues to follow up with Dr. Brasher and does not require any further diagnostic imaging studies..

## 2023-07-22 NOTE — PHYSICAL EXAM
[Normocephalic] : normocephalic [Atraumatic] : atraumatic [EOMI] : extra ocular movement intact [No Supraclavicular Adenopathy] : no supraclavicular adenopathy [Supple] : supple [No Cervical Adenopathy] : no cervical adenopathy [Examined in the supine and seated position] : examined in the supine and seated position [No dominant masses] : no dominant masses in right breast  [No dominant masses] : no dominant masses left breast [Breast Mass Left Breast ___cm] : no masses [Breast Mass Right Breast ___cm] : no masses [No Axillary Lymphadenopathy] : no left axillary lymphadenopathy [No Edema] : no edema [No Rashes] : no rashes [No Ulceration] : no ulceration [de-identified] : On exam, the patient has the obvious bilateral total mastectomies with a left sentinel lymph node biopsy and had bilateral direct to implant reconstructions in the subpectoral location.  She received radiation of the left implant and has some more tightness on the left side but it is acceptable.  She has no suspicious findings over either implant but did have a biopsy on the inferior aspect over the left implant in April 2022 showing fat necrosis.  Her cosmetic result improved after exchange of her implants in June 2020.  She did have a superficial burn in the upper outer aspect of the right breast and also developed a hypertrophic scar over the Port-A-Cath site.  She has no axillary, supraclavicular, or cervical adenopathy. [de-identified] : Status post total mastectomy with subpectoral direct to implant reconstruction with no suspicious findings. [de-identified] : Status post total mastectomy with subpectoral direct to implant reconstruction with no evidence of recurrence

## 2023-07-22 NOTE — HISTORY OF PRESENT ILLNESS
[FreeTextEntry1] : The patient is a 38-year-old G4, P3 premenopausal white female of [place] descent. She underwent menarche at age 12 and had her first child at age 31. She has a strong family history with her paternal aunt who had lung cancer at age 54 and her paternal great grandmother who had breast cancer at age 84. Her maternal great grandmother had rectal cancer at age 82 and then breast cancer at age 84 and then ovarian cancer at age 55. She has a maternal great aunt who had breast cancer at age 50 and her maternal grandfather had lung cancer at age 70. The patient had her first child in  with IVF and then had a failed IVF treatment in  and became pregnant with twins through IVF in 2018. She felt a left breast upper outer quadrant mass around the beginning of 2018 when she was 28 weeks pregnant with her twins and she was sent for an ultrasound which showed a suspicious density over the palpable region measuring 1.1 x 0.8 x 1.5 cm with a suspicious lower left axillary lymph node. Ultrasound-guided core biopsy showed a high-grade invasive duct cancer which was ER/IN negative HER-2/mal 3+ by IHC. Lymph node FNA also showed metastatic cancer. Genetic testing with [employer] risk panel testing was performed at [place] showed a VUS in the T p53 gene. She was seen by Dr. Gibbs at the [place] [facility] and initially surgery was recommended. However, after discussing the case with Dr. Gibbs and the patient's gynecologist, Dr. Goode, it was decided for the patient to undergo neoadjuvant chemotherapy and she underwent AC through Dr. Brasher and then had a  delivering her twins and finished the AC chemotherapy and then Taxotere Herceptin and Perjeta postpartum. She had a follow-up MRI in 2019 showing a lesion in the right hepatic lobe which turned out to be hemangioma but there are also some enhancing focus is in the left breast retroareolar region and some clumped progressive enhancement in the right breast. Directed ultrasound showed a couple benign small fibroadenomas in the right breast and MRI biopsies were recommended in the right breast upper inner region and left breast retroareolar region. She decided to undergo bilateral total mastectomies and I also performed a localized sentinel lymph node biopsy in the left axilla and she had bilateral subpectoral direct implant reconstructions with AlloDerm on 2019. Final pathology showed 4 negative sentinel lymph nodes with the node with the clip just showing fibrosis. The cancer was completely gone from the left breast on final pathology only showing hyalinizing fibrosis. She did have another 6 nodes removed on the left side making a total of 10 negative nodes. This was a clinical prognostic stage IIA breast cancer prior to neoadjuvant chemotherapy with a complete pathologic response after neoadjuvant chemotherapy. The patient was seen by Dr. Brasher and received external beam radiation to Dr. Salas Sullivan at [facility] which started on 2019 and finished on 2019. She finished Herceptin through Dr. Brasher on 2020 and he attempted to put her on neratinib but she could not tolerate it. She had her right sided Port-A-Cath removed and did develop some hypertrophic scarring. She did have a right breast mastopexy and revision of her implants by Dr. Cyr in 2020.  She was found to have a palpable density over the inferior aspect of the left implant and underwent an ultrasound and ultrasound-guided core biopsy on April 15, 2022 ordered by Dr. Brasher at [facility] which showed fat necrosis.  She comes in now for routine follow-up.

## 2023-07-22 NOTE — REVIEW OF SYSTEMS
[Recent Weight Gain (___ Lbs)] : recent [unfilled] ~Ulb weight gain [Heartburn] : heartburn [Abdominal Pain] : abdominal pain [Muscle Weakness] : muscle weakness [As Noted in HPI] : as noted in HPI [Easy Bruising] : a tendency for easy bruising [Negative] : Psychiatric

## 2023-07-26 NOTE — PHYSICAL EXAM
[Normocephalic] : normocephalic [Atraumatic] : atraumatic [EOMI] : extra ocular movement intact [Supple] : supple [No Supraclavicular Adenopathy] : no supraclavicular adenopathy [No Cervical Adenopathy] : no cervical adenopathy [Examined in the supine and seated position] : examined in the supine and seated position [No dominant masses] : no dominant masses in right breast  [No dominant masses] : no dominant masses left breast [Breast Mass Right Breast ___cm] : no masses [Breast Mass Left Breast ___cm] : no masses [No Axillary Lymphadenopathy] : no left axillary lymphadenopathy [No Edema] : no edema [No Rashes] : no rashes [No Ulceration] : no ulceration [de-identified] : On exam, the patient has the obvious bilateral total mastectomies with a left sentinel lymph node biopsy and had bilateral direct to implant reconstructions in the subpectoral location.  She received radiation of the left implant and has some more tightness on the left side but it is acceptable.  She has no suspicious findings over either implant but did have a biopsy on the inferior aspect over the left implant in April 2022 showing fat necrosis.  Her cosmetic result improved after exchange of her implants in June 2020.  She did have a superficial burn in the upper outer aspect of the right breast and also developed a hypertrophic scar over the Port-A-Cath site.  She has no axillary, supraclavicular, or cervical adenopathy. [de-identified] : Status post total mastectomy with subpectoral direct to implant reconstruction with no suspicious findings. [de-identified] : Status post total mastectomy with subpectoral direct to implant reconstruction with no evidence of recurrence

## 2023-07-26 NOTE — REASON FOR VISIT
[Follow-Up: _____] : a [unfilled] follow-up visit [FreeTextEntry1] : The patient has a strong family history of breast cancer as well as ovarian cancer and was diagnosed with a high-grade invasive cancer which was ER/NC negative HER-2/mal 3+ which was noted towards the end of her last pregnancy with twins. Genetic testing was negative and she received neoadjuvant AC chemotherapy with Dr. Brasher towards the end of her pregnancy and then delivered her twins by  and finished the rest of her chemotherapy receiving Herceptin and Perjeta as well. MRI after neoadjuvant chemotherapy showed some other findings in the right breast which were felt to be benign however she decided to undergo bilateral total mastectomies and had a localized sentinel lymph node biopsy with bilateral subpectoral direct to implant reconstructions with AlloDerm in 2019. She had 4 negative sentinel lymph nodes and did have another 6 nodes removed for total of 10 negative nodes. This was a clinical pathologic stage IIA breast cancer prior to chemotherapy but with a complete pathologic response after neoadjuvant chemotherapy. She received external beam radiation over the left implant and finished Herceptin and could not tolerate neratinib. She had further revision on the right side for symmetry and comes in now for routine follow-up.

## 2023-07-26 NOTE — ASSESSMENT
[FreeTextEntry1] : The patient is a 38-year-old G4, P3 premenopausal white female of [place] descent. She underwent menarche at age 12 and had her first child at age 31. She has a strong family history with her paternal aunt who had lung cancer at age 54 and her paternal great grandmother who had breast cancer at age 84. Her maternal great grandmother had rectal cancer at age 82 and then breast cancer at age 84 and then ovarian cancer at age 55. She has a maternal great aunt who had breast cancer at age 50 and her maternal grandfather had lung cancer at age 70. The patient had her first child in  with IVF and then had a failed IVF treatment in  and became pregnant with twins through IVF in 2018. She felt a left breast upper outer quadrant mass around the beginning of 2018 when she was 28 weeks pregnant with her twins and she was sent for an ultrasound which showed a suspicious density over the palpable region measuring 1.1 x 0.8 x 1.5 cm with a suspicious lower left axillary lymph node. Ultrasound-guided core biopsy showed a high-grade invasive duct cancer which was ER/NM negative HER-2/mal 3+ by IHC. Lymph node FNA also showed metastatic cancer. Genetic testing with [employer] risk panel testing was performed at [place] [facility] showed a VUS in the T p53 gene. She was seen by Dr. Gibbs at the [place] [facility] and initially surgery was recommended. However, after discussing the case with Dr. Gibbs and the patient's gynecologist, Dr. Goode, it was decided for the patient to undergo neoadjuvant chemotherapy and she underwent AC through Dr. Brasher and then had a  delivering her twins and finished the AC chemotherapy and then Taxotere Herceptin and Perjeta postpartum. She had a follow-up MRI in 2019 showing a lesion in the right hepatic lobe which turned out to be hemangioma but there were also some enhancing focuses in the left breast retroareolar region and some clumped progressive enhancement in the right breast. Directed ultrasound showed a couple benign small fibroadenomas in the right breast and MRI biopsies were recommended in the right breast upper inner region and left breast retroareolar region. She decided to undergo bilateral total mastectomies and I also performed a localized sentinel lymph node biopsy in the left axilla and she had bilateral subpectoral direct to implant reconstructions with AlloDerm on 2019. Final pathology showed 4 negative sentinel lymph nodes with the node with the clip just showing fibrosis. The cancer was completely gone from the left breast on final pathology only showing hyalinizing fibrosis. She did have another 6 nodes removed on the left side making a total of 10 negative nodes. This was a clinical prognostic stage IIA breast cancer prior to neoadjuvant chemotherapy with a complete pathologic response after neoadjuvant chemotherapy. The patient was seen by Dr. Brasher and received external beam radiation through Dr. Salas Sullivan at [facility] which started on 2019 and finished on 2019. She finished Herceptin through Dr. Brasher on 2020 and he attempted to put her on neratinib but she could not tolerate it. She had her right sided Port-A-Cath removed and did develop some hypertrophic scarring. She did have a right breast mastopexy and revision of her implants by Dr. Cyr in 2020.  On exam today, I cannot feel any evidence of recurrence over the left implant.  She never underwent nipple reconstructions.  She did develop a palpable density felt over the lower aspect of the left implant and ended up undergoing an ultrasound and ultrasound-guided core biopsy at [facility] performed on April 15, 2022 which came back as fat necrosis which is likely due from her prior fat grafting.   She also lost about 40 pounds.  She underwent a follow up diagnostic left breast ultrasound at [facility] on ????? which showed ????????.  The patient was reassured and should follow-up again in 6 months in 2024. She continues to follow up with Dr. Brasher and does not require any further diagnostic imaging studies..

## 2023-07-26 NOTE — HISTORY OF PRESENT ILLNESS
[FreeTextEntry1] : The patient is a 38-year-old G4, P3 premenopausal white female of [place] descent. She underwent menarche at age 12 and had her first child at age 31. She has a strong family history with her paternal aunt who had lung cancer at age 54 and her paternal great grandmother who had breast cancer at age 84. Her maternal great grandmother had rectal cancer at age 82 and then breast cancer at age 84 and then ovarian cancer at age 55. She has a maternal great aunt who had breast cancer at age 50 and her maternal grandfather had lung cancer at age 70. The patient had her first child in  with IVF and then had a failed IVF treatment in  and became pregnant with twins through IVF in 2018. She felt a left breast upper outer quadrant mass around the beginning of 2018 when she was 28 weeks pregnant with her twins and she was sent for an ultrasound which showed a suspicious density over the palpable region measuring 1.1 x 0.8 x 1.5 cm with a suspicious lower left axillary lymph node. Ultrasound-guided core biopsy showed a high-grade invasive duct cancer which was ER/IN negative HER-2/mal 3+ by IHC. Lymph node FNA also showed metastatic cancer. Genetic testing with [employer] risk panel testing was performed at [place] showed a VUS in the T p53 gene. She was seen by Dr. Gibbs at the [place] [facility] of [facility] and initially surgery was recommended. However, after discussing the case with Dr. Gibbs and the patient's gynecologist, Dr. Goode, it was decided for the patient to undergo neoadjuvant chemotherapy and she underwent AC through Dr. Brasher and then had a  delivering her twins and finished the AC chemotherapy and then Taxotere Herceptin and Perjeta postpartum. She had a follow-up MRI in 2019 showing a lesion in the right hepatic lobe which turned out to be hemangioma but there are also some enhancing focus is in the left breast retroareolar region and some clumped progressive enhancement in the right breast. Directed ultrasound showed a couple benign small fibroadenomas in the right breast and MRI biopsies were recommended in the right breast upper inner region and left breast retroareolar region. She decided to undergo bilateral total mastectomies and I also performed a localized sentinel lymph node biopsy in the left axilla and she had bilateral subpectoral direct implant reconstructions with AlloDerm on 2019. Final pathology showed 4 negative sentinel lymph nodes with the node with the clip just showing fibrosis. The cancer was completely gone from the left breast on final pathology only showing hyalinizing fibrosis. She did have another 6 nodes removed on the left side making a total of 10 negative nodes. This was a clinical prognostic stage IIA breast cancer prior to neoadjuvant chemotherapy with a complete pathologic response after neoadjuvant chemotherapy. The patient was seen by Dr. Brasher and received external beam radiation to Dr. Salas Sullivan at [facility] which started on 2019 and finished on 2019. She finished Herceptin through Dr. Brasher on 2020 and he attempted to put her on neratinib but she could not tolerate it. She had her right sided Port-A-Cath removed and did develop some hypertrophic scarring. She did have a right breast mastopexy and revision of her implants by Dr. Cyr in 2020.  She was found to have a palpable density over the inferior aspect of the left implant and underwent an ultrasound and ultrasound-guided core biopsy on April 15, 2022 ordered by Dr. Brasher at [facility] which showed fat necrosis.  She comes in now for routine follow-up.

## 2023-08-01 ENCOUNTER — APPOINTMENT (OUTPATIENT)
Dept: BREAST CENTER | Facility: CLINIC | Age: 38
End: 2023-08-01
Payer: COMMERCIAL

## 2023-08-03 ENCOUNTER — APPOINTMENT (OUTPATIENT)
Dept: BREAST CENTER | Facility: CLINIC | Age: 38
End: 2023-08-03
Payer: COMMERCIAL

## 2023-08-03 VITALS
OXYGEN SATURATION: 100 % | SYSTOLIC BLOOD PRESSURE: 108 MMHG | HEIGHT: 65 IN | WEIGHT: 123 LBS | BODY MASS INDEX: 20.49 KG/M2 | DIASTOLIC BLOOD PRESSURE: 74 MMHG | HEART RATE: 57 BPM

## 2023-08-03 DIAGNOSIS — Z85.3 PERSONAL HISTORY OF MALIGNANT NEOPLASM OF BREAST: ICD-10-CM

## 2023-08-03 DIAGNOSIS — Z90.13 ACQUIRED ABSENCE OF BILATERAL BREASTS AND NIPPLES: ICD-10-CM

## 2023-08-03 PROCEDURE — 99213 OFFICE O/P EST LOW 20 MIN: CPT

## 2023-08-03 NOTE — REASON FOR VISIT
[Follow-Up: _____] : a [unfilled] follow-up visit [FreeTextEntry1] : The patient has a strong family history of breast cancer as well as ovarian cancer and was diagnosed with a high-grade invasive cancer which was ER/CA negative HER-2/mal 3+ which was noted towards the end of her last pregnancy with twins. Genetic testing was negative and she received neoadjuvant AC chemotherapy with Dr. Brasher towards the end of her pregnancy and then delivered her twins by  and finished the rest of her chemotherapy receiving Herceptin and Perjeta as well. MRI after neoadjuvant chemotherapy showed some other findings in the right breast which were felt to be benign however she decided to undergo bilateral total mastectomies and had a localized sentinel lymph node biopsy with bilateral subpectoral direct to implant reconstructions with AlloDerm in 2019. She had 4 negative sentinel lymph nodes and did have another 6 nodes removed for total of 10 negative nodes. This was a clinical pathologic stage IIA breast cancer prior to chemotherapy but with a complete pathologic response after neoadjuvant chemotherapy. She received external beam radiation over the left implant and finished Herceptin and could not tolerate neratinib. She had further revision on the right side for symmetry and comes in now for routine follow-up.

## 2023-08-03 NOTE — PHYSICAL EXAM
[Normocephalic] : normocephalic [Atraumatic] : atraumatic [EOMI] : extra ocular movement intact [Supple] : supple [No Supraclavicular Adenopathy] : no supraclavicular adenopathy [No Cervical Adenopathy] : no cervical adenopathy [Examined in the supine and seated position] : examined in the supine and seated position [No dominant masses] : no dominant masses in right breast  [No dominant masses] : no dominant masses left breast [Breast Mass Right Breast ___cm] : no masses [Breast Mass Left Breast ___cm] : no masses [No Axillary Lymphadenopathy] : no left axillary lymphadenopathy [No Edema] : no edema [No Rashes] : no rashes [No Ulceration] : no ulceration [de-identified] : On exam, the patient has the obvious bilateral total mastectomies with a left sentinel lymph node biopsy and had bilateral direct to implant reconstructions in the subpectoral location.  She received radiation of the left implant and has some more tightness on the left side but it is acceptable.  She has no suspicious findings over either implant but did have a biopsy on the inferior aspect over the left implant in April 2022 showing fat necrosis.  Her cosmetic result improved after exchange of her implants in June 2020.  She did have a superficial burn in the upper outer aspect of the right breast and also developed a hypertrophic scar over the Port-A-Cath site.  She has no axillary, supraclavicular, or cervical adenopathy. [de-identified] : Status post total mastectomy with subpectoral direct to implant reconstruction with no suspicious findings. [de-identified] : Status post total mastectomy with subpectoral direct to implant reconstruction with no evidence of recurrence

## 2023-08-03 NOTE — HISTORY OF PRESENT ILLNESS
[FreeTextEntry1] : The patient is a 38-year-old G4, P3 premenopausal white female of [place] descent. She underwent menarche at age 12 and had her first child at age 31. She has a strong family history with her paternal aunt who had lung cancer at age 54 and her paternal great grandmother who had breast cancer at age 84. Her maternal great grandmother had rectal cancer at age 82 and then breast cancer at age 84 and then ovarian cancer at age 55. She has a maternal great aunt who had breast cancer at age 50 and her maternal grandfather had lung cancer at age 70. The patient had her first child in  with IVF and then had a failed IVF treatment in  and became pregnant with twins through IVF in 2018. She felt a left breast upper outer quadrant mass around the beginning of 2018 when she was 28 weeks pregnant with her twins and she was sent for an ultrasound which showed a suspicious density over the palpable region measuring 1.1 x 0.8 x 1.5 cm with a suspicious lower left axillary lymph node. Ultrasound-guided core biopsy showed a high-grade invasive duct cancer which was ER/AL negative HER-2/mal 3+ by IHC. Lymph node FNA also showed metastatic cancer. Genetic testing with [employer] risk panel testing was performed at [place] showed a VUS in the T p53 gene. She was seen by Dr. Gibbs at the [place] [facility] of [facility] and initially surgery was recommended. However, after discussing the case with Dr. Gibbs and the patient's gynecologist, Dr. Goode, it was decided for the patient to undergo neoadjuvant chemotherapy and she underwent AC through Dr. Brasher and then had a  delivering her twins and finished the AC chemotherapy and then Taxotere Herceptin and Perjeta postpartum. She had a follow-up MRI in 2019 showing a lesion in the right hepatic lobe which turned out to be hemangioma but there are also some enhancing focus is in the left breast retroareolar region and some clumped progressive enhancement in the right breast. Directed ultrasound showed a couple benign small fibroadenomas in the right breast and MRI biopsies were recommended in the right breast upper inner region and left breast retroareolar region. She decided to undergo bilateral total mastectomies and I also performed a localized sentinel lymph node biopsy in the left axilla and she had bilateral subpectoral direct implant reconstructions with AlloDerm on 2019. Final pathology showed 4 negative sentinel lymph nodes with the node with the clip just showing fibrosis. The cancer was completely gone from the left breast on final pathology only showing hyalinizing fibrosis. She did have another 6 nodes removed on the left side making a total of 10 negative nodes. This was a clinical prognostic stage IIA breast cancer prior to neoadjuvant chemotherapy with a complete pathologic response after neoadjuvant chemotherapy. The patient was seen by Dr. Brasher and received external beam radiation to Dr. Salas Sullivan at [facility] which started on 2019 and finished on 2019. She finished Herceptin through Dr. Brasher on 2020 and he attempted to put her on neratinib but she could not tolerate it. She had her right sided Port-A-Cath removed and did develop some hypertrophic scarring. She did have a right breast mastopexy and revision of her implants by Dr. Cyr in 2020.  She was found to have a palpable density over the inferior aspect of the left implant and underwent an ultrasound and ultrasound-guided core biopsy on April 15, 2022 ordered by Dr. Brasher at [facility] which showed fat necrosis.  She comes in now for routine follow-up.

## 2023-08-03 NOTE — ASSESSMENT
[FreeTextEntry1] : The patient is a 38-year-old G4, P3 premenopausal white female of [place] descent. She underwent menarche at age 12 and had her first child at age 31. She has a strong family history with her paternal aunt who had lung cancer at age 54 and her paternal great grandmother who had breast cancer at age 84. Her maternal great grandmother had rectal cancer at age 82 and then breast cancer at age 84 and then ovarian cancer at age 55. She has a maternal great aunt who had breast cancer at age 50 and her maternal grandfather had lung cancer at age 70. The patient had her first child in  with IVF and then had a failed IVF treatment in  and became pregnant with twins through IVF in 2018. She felt a left breast upper outer quadrant mass around the beginning of 2018 when she was 28 weeks pregnant with her twins and she was sent for an ultrasound which showed a suspicious density over the palpable region measuring 1.1 x 0.8 x 1.5 cm with a suspicious lower left axillary lymph node. Ultrasound-guided core biopsy showed a high-grade invasive duct cancer which was ER/MA negative HER-2/mal 3+ by IHC. Lymph node FNA also showed metastatic cancer. Genetic testing with [employer] risk panel testing was performed at [place] showed a VUS in the T p53 gene. She was seen by Dr. Gibbs at the [place] [facility] of [facility] and initially surgery was recommended. However, after discussing the case with Dr. Gibbs and the patient's gynecologist, Dr. Goode, it was decided for the patient to undergo neoadjuvant chemotherapy and she underwent AC through Dr. Brasher and then had a  delivering her twins and finished the AC chemotherapy and then Taxotere Herceptin and Perjeta postpartum. She had a follow-up MRI in 2019 showing a lesion in the right hepatic lobe which turned out to be hemangioma but there were also some enhancing focuses in the left breast retroareolar region and some clumped progressive enhancement in the right breast. Directed ultrasound showed a couple benign small fibroadenomas in the right breast and MRI biopsies were recommended in the right breast upper inner region and left breast retroareolar region. She decided to undergo bilateral total mastectomies and I also performed a localized sentinel lymph node biopsy in the left axilla and she had bilateral subpectoral direct to implant reconstructions with AlloDerm on 2019. Final pathology showed 4 negative sentinel lymph nodes with the node with the clip just showing fibrosis. The cancer was completely gone from the left breast on final pathology only showing hyalinizing fibrosis. She did have another 6 nodes removed on the left side making a total of 10 negative nodes. This was a clinical prognostic stage IIA breast cancer prior to neoadjuvant chemotherapy with a complete pathologic response after neoadjuvant chemotherapy. The patient was seen by Dr. Brasher and received external beam radiation through Dr. Salas Sullivan at [facility] which started on 2019 and finished on 2019. She finished Herceptin through Dr. Brasher on 2020 and he attempted to put her on neratinib but she could not tolerate it. She had her right sided Port-A-Cath removed and did develop some hypertrophic scarring. She did have a right breast mastopexy and revision of her implants by Dr. Cyr in 2020.  On exam today, I cannot feel any evidence of recurrence over the left implant.  She never underwent nipple reconstructions.  She did develop a palpable density felt over the lower aspect of the left implant and ended up undergoing an ultrasound and ultrasound-guided core biopsy at [facility] performed on April 15, 2022 which came back as fat necrosis which is likely due from her prior fat grafting.   She also lost about 40 pounds.  She underwent a follow up diagnostic left breast ultrasound at [place] [facility] on 2023 which showed this previous left breast 6:00 periareolar area of fat necrosis to have dissipated.  The patient was reassured and can now follow-up yearly in 2024.  She continues to follow up with Dr. Brasher and does not require any further diagnostic imaging studies..

## 2024-07-23 ENCOUNTER — NON-APPOINTMENT (OUTPATIENT)
Age: 39
End: 2024-07-23

## 2024-08-13 ENCOUNTER — APPOINTMENT (OUTPATIENT)
Dept: BREAST CENTER | Facility: CLINIC | Age: 39
End: 2024-08-13
Payer: COMMERCIAL

## 2024-08-13 VITALS
SYSTOLIC BLOOD PRESSURE: 105 MMHG | BODY MASS INDEX: 25.27 KG/M2 | OXYGEN SATURATION: 100 % | HEART RATE: 75 BPM | HEIGHT: 64 IN | WEIGHT: 148 LBS | DIASTOLIC BLOOD PRESSURE: 70 MMHG

## 2024-08-13 DIAGNOSIS — Z12.39 ENCOUNTER FOR OTHER SCREENING FOR MALIGNANT NEOPLASM OF BREAST: ICD-10-CM

## 2024-08-13 DIAGNOSIS — Z85.3 PERSONAL HISTORY OF MALIGNANT NEOPLASM OF BREAST: ICD-10-CM

## 2024-08-13 DIAGNOSIS — Z90.13 ACQUIRED ABSENCE OF BILATERAL BREASTS AND NIPPLES: ICD-10-CM

## 2024-08-13 DIAGNOSIS — R92.30 DENSE BREASTS, UNSPECIFIED: ICD-10-CM

## 2024-08-13 PROCEDURE — 99203 OFFICE O/P NEW LOW 30 MIN: CPT

## 2024-08-13 PROCEDURE — 99213 OFFICE O/P EST LOW 20 MIN: CPT

## 2024-08-13 NOTE — REASON FOR VISIT
[Follow-Up: _____] : a [unfilled] follow-up visit [FreeTextEntry1] : The patient has a strong family history of breast cancer as well as ovarian cancer and was diagnosed with a high-grade invasive cancer which was ER/DE negative HER-2/mal 3+ which was noted towards the end of her last pregnancy with twins. Genetic testing was negative and she received neoadjuvant AC chemotherapy with Dr. Brasher towards the end of her pregnancy and then delivered her twins by  and finished the rest of her chemotherapy receiving Herceptin and Perjeta as well. MRI after neoadjuvant chemotherapy showed some other findings in the right breast which were felt to be benign however she decided to undergo bilateral total mastectomies and had a localized sentinel lymph node biopsy with bilateral subpectoral direct to implant reconstructions with AlloDerm in 2019. She had 4 negative sentinel lymph nodes and did have another 6 nodes removed for total of 10 negative nodes. This was a clinical pathologic stage IIA breast cancer prior to chemotherapy but with a complete pathologic response after neoadjuvant chemotherapy. She received external beam radiation over the left implant and finished Herceptin and could not tolerate neratinib. She had further revision on the right side for symmetry and comes in now for routine follow-up.

## 2024-08-13 NOTE — ADDENDUM
[FreeTextEntry1] : I spent greater than 75% of consultation in face-to-face counseling and coordination of care in this patient with a strong family history of breast cancer and a personal history of a left breast cancer for which she underwent bilateral mastectomies who comes in now for routine breast cancer screening/surveillance.

## 2024-08-13 NOTE — REASON FOR VISIT
[Follow-Up: _____] : a [unfilled] follow-up visit [FreeTextEntry1] : The patient has a strong family history of breast cancer as well as ovarian cancer and was diagnosed with a high-grade invasive cancer which was ER/MN negative HER-2/mal 3+ which was noted towards the end of her last pregnancy with twins. Genetic testing was negative and she received neoadjuvant AC chemotherapy with Dr. Brasher towards the end of her pregnancy and then delivered her twins by  and finished the rest of her chemotherapy receiving Herceptin and Perjeta as well. MRI after neoadjuvant chemotherapy showed some other findings in the right breast which were felt to be benign however she decided to undergo bilateral total mastectomies and had a localized sentinel lymph node biopsy with bilateral subpectoral direct to implant reconstructions with AlloDerm in 2019. She had 4 negative sentinel lymph nodes and did have another 6 nodes removed for total of 10 negative nodes. This was a clinical pathologic stage IIA breast cancer prior to chemotherapy but with a complete pathologic response after neoadjuvant chemotherapy. She received external beam radiation over the left implant and finished Herceptin and could not tolerate neratinib. She had further revision on the right side for symmetry and comes in now for routine follow-up.

## 2024-08-13 NOTE — HISTORY OF PRESENT ILLNESS
[FreeTextEntry1] : The patient is a 39-year-old G4, P3 premenopausal white female of [place] descent. She underwent menarche at age 12 and had her first child at age 31. She has a strong family history with her paternal aunt who had lung cancer at age 54 and her paternal great grandmother who had breast cancer at age 84. Her maternal great grandmother had rectal cancer at age 82 and then breast cancer at age 84 and then ovarian cancer at age 55. She has a maternal great aunt who had breast cancer at age 50 and her maternal grandfather had lung cancer at age 70. The patient had her first child in  with IVF and then had a failed IVF treatment in  and became pregnant with twins through IVF in 2018. She felt a left breast upper outer quadrant mass around the beginning of 2018 when she was 28 weeks pregnant with her twins and she was sent for an ultrasound which showed a suspicious density over the palpable region measuring 1.1 x 0.8 x 1.5 cm with a suspicious lower left axillary lymph node. Ultrasound-guided core biopsy showed a high-grade invasive duct cancer which was ER/TN negative HER-2/mal 3+ by IHC. Lymph node FNA also showed metastatic cancer. Genetic testing with [employer] risk panel testing was performed at [place] showed a VUS in the T p53 gene. She was seen by Dr. Gibbs at the [place] [facility] of [facility] and initially surgery was recommended. However, after discussing the case with Dr. Gibbs and the patient's gynecologist, Dr. Goode, it was decided for the patient to undergo neoadjuvant chemotherapy and she underwent AC through Dr. Brasher and then had a  delivering her twins and finished the AC chemotherapy and then Taxotere Herceptin and Perjeta postpartum. She had a follow-up MRI in 2019 showing a lesion in the right hepatic lobe which turned out to be hemangioma but there are also some enhancing focus is in the left breast retroareolar region and some clumped progressive enhancement in the right breast. Directed ultrasound showed a couple benign small fibroadenomas in the right breast and MRI biopsies were recommended in the right breast upper inner region and left breast retroareolar region. She decided to undergo bilateral total mastectomies and I also performed a localized sentinel lymph node biopsy in the left axilla and she had bilateral subpectoral direct implant reconstructions with AlloDerm on 2019. Final pathology showed 4 negative sentinel lymph nodes with the node with the clip just showing fibrosis. The cancer was completely gone from the left breast on final pathology only showing hyalinizing fibrosis. She did have another 6 nodes removed on the left side making a total of 10 negative nodes. This was a clinical prognostic stage IIA breast cancer prior to neoadjuvant chemotherapy with a complete pathologic response after neoadjuvant chemotherapy. The patient was seen by Dr. Brasher and received external beam radiation to Dr. Salas Sullivan at [facility] which started on 2019 and finished on 2019. She finished Herceptin through Dr. Brasher on 2020 and he attempted to put her on neratinib but she could not tolerate it. She had her right sided Port-A-Cath removed and did develop some hypertrophic scarring. She did have a right breast mastopexy and revision of her implants by Dr. Cyr in 2020.  She was found to have a palpable density over the inferior aspect of the left implant and underwent an ultrasound and ultrasound-guided core biopsy on April 15, 2022 ordered by Dr. Brasher at [facility] which showed fat necrosis.  She comes in now for routine follow-up.

## 2024-08-13 NOTE — PHYSICAL EXAM
[Normocephalic] : normocephalic [Atraumatic] : atraumatic [EOMI] : extra ocular movement intact [Supple] : supple [No Supraclavicular Adenopathy] : no supraclavicular adenopathy [No Cervical Adenopathy] : no cervical adenopathy [Examined in the supine and seated position] : examined in the supine and seated position [No dominant masses] : no dominant masses in right breast  [No dominant masses] : no dominant masses left breast [Breast Mass Right Breast ___cm] : no masses [Breast Mass Left Breast ___cm] : no masses [No Axillary Lymphadenopathy] : no left axillary lymphadenopathy [No Edema] : no edema [No Rashes] : no rashes [No Ulceration] : no ulceration [de-identified] : On exam, the patient has the obvious bilateral total mastectomies with a left sentinel lymph node biopsy and had bilateral direct to implant reconstructions in the subpectoral location.  She received radiation of the left implant and has some more tightness on the left side but it is acceptable.  She has no suspicious findings over either implant but did have a biopsy on the inferior aspect over the left implant in April 2022 showing fat necrosis.  Her cosmetic result improved after exchange of her implants in June 2020.  She did have a superficial burn in the upper outer aspect of the right breast and also developed a hypertrophic scar over the Port-A-Cath site.  She has no axillary, supraclavicular, or cervical adenopathy. [de-identified] : Status post total mastectomy with subpectoral direct to implant reconstruction with no suspicious findings. [de-identified] : Status post total mastectomy with subpectoral direct to implant reconstruction with no evidence of recurrence

## 2024-08-13 NOTE — ASSESSMENT
[FreeTextEntry1] : The patient is a 39-year-old G4, P3 premenopausal white female of [place] descent. She underwent menarche at age 12 and had her first child at age 31. She has a strong family history with her paternal aunt who had lung cancer at age 54 and her paternal great grandmother who had breast cancer at age 84. Her maternal great grandmother had rectal cancer at age 82 and then breast cancer at age 84 and then ovarian cancer at age 55. She has a maternal great aunt who had breast cancer at age 50 and her maternal grandfather had lung cancer at age 70. The patient had her first child in  with IVF and then had a failed IVF treatment in  and became pregnant with twins through IVF in 2018. She felt a left breast upper outer quadrant mass around the beginning of 2018 when she was 28 weeks pregnant with her twins and she was sent for an ultrasound which showed a suspicious density over the palpable region measuring 1.1 x 0.8 x 1.5 cm with a suspicious lower left axillary lymph node. Ultrasound-guided core biopsy showed a high-grade invasive duct cancer which was ER/LA negative HER-2/mal 3+ by IHC. Lymph node FNA also showed metastatic cancer. Genetic testing with [employer] panel testing was performed at [facility] showed a VUS in the T p53 gene. She was seen by Dr. Gibbs at the [place] [facility] and initially surgery was recommended. However, after discussing the case with Dr. Gibbs and the patient's gynecologist, Dr. Goode, it was decided for the patient to undergo neoadjuvant chemotherapy and she underwent AC through Dr. Brasher and then had a  delivering her twins and finished the AC chemotherapy and then Taxotere Herceptin and Perjeta postpartum. She had a follow-up MRI on 2019 showing a lesion in the right hepatic lobe which turned out to be hemangioma but there were also some enhancing focuses in the left breast retroareolar region and some clumped progressive enhancement in the right breast. Directed ultrasound showed a couple benign small fibroadenomas in the right breast and MRI biopsies were recommended in the right breast upper inner region and left breast retroareolar region. She decided to undergo bilateral total mastectomies and I also performed a localized sentinel lymph node biopsy in the left axilla and she had bilateral subpectoral direct to implant reconstructions with AlloDerm on 2019. Final pathology showed 4 negative sentinel lymph nodes with the node with the clip just showing fibrosis. The cancer was completely gone from the left breast on final pathology only showing hyalinizing fibrosis. She did have another 6 nodes removed on the left side making a total of 10 negative nodes. This was a clinical prognostic stage IIA breast cancer prior to neoadjuvant chemotherapy with a complete pathologic response after neoadjuvant chemotherapy. The patient was seen by Dr. Brasher and received external beam radiation through Dr. Salas Sullivan at [facility] which started on 2019 and finished on 2019. She finished Herceptin through Dr. Brasher on 2020 and he attempted to put her on neratinib but she could not tolerate it. She had her right sided Port-A-Cath removed and did develop some hypertrophic scarring. She did have a right breast mastopexy and revision of her implants by Dr. Cyr in 2020.  On exam today, I cannot feel any evidence of recurrence over the left implant.  She never underwent nipple reconstructions.  She continues have some tightness around her implants but this is stable and she has full range of motion and no signs of any lymphedema.  She did develop a palpable density felt over the lower aspect of the left implant and ended up undergoing an ultrasound and ultrasound-guided core biopsy at [facility] performed on April 15, 2022 which came back as fat necrosis which is likely due from her prior fat grafting.   She also lost about 40 pounds.  She underwent a follow up diagnostic left breast ultrasound at [facility] on 2023 which showed this previous left breast 6:00 periareolar area of fat necrosis to have dissipated.  The patient was reassured and can now follow-up yearly in 2025.  She continues to follow up with Dr. Brasher and does not require any further diagnostic imaging studies.  I did tell the patient that Dr. Cyr is now working down to [place] and if she needs any plastic surgery consultation she could always use Dr. Srivastava who has taken over Dr. Cyr's practice.

## 2024-08-13 NOTE — HISTORY OF PRESENT ILLNESS
[FreeTextEntry1] : The patient is a 39-year-old G4, P3 premenopausal white female of [place] descent. She underwent menarche at age 12 and had her first child at age 31. She has a strong family history with her paternal aunt who had lung cancer at age 54 and her paternal great grandmother who had breast cancer at age 84. Her maternal great grandmother had rectal cancer at age 82 and then breast cancer at age 84 and then ovarian cancer at age 55. She has a maternal great aunt who had breast cancer at age 50 and her maternal grandfather had lung cancer at age 70. The patient had her first child in  with IVF and then had a failed IVF treatment in  and became pregnant with twins through IVF in 2018. She felt a left breast upper outer quadrant mass around the beginning of 2018 when she was 28 weeks pregnant with her twins and she was sent for an ultrasound which showed a suspicious density over the palpable region measuring 1.1 x 0.8 x 1.5 cm with a suspicious lower left axillary lymph node. Ultrasound-guided core biopsy showed a high-grade invasive duct cancer which was ER/CT negative HER-2/mal 3+ by IHC. Lymph node FNA also showed metastatic cancer. Genetic testing with [employer] risk panel testing was performed at [facility] showed a VUS in the T p53 gene. She was seen by Dr. Gibbs at the [place] [facility] and initially surgery was recommended. However, after discussing the case with Dr. Gibbs and the patient's gynecologist, Dr. Goode, it was decided for the patient to undergo neoadjuvant chemotherapy and she underwent AC through Dr. Brasher and then had a  delivering her twins and finished the AC chemotherapy and then Taxotere Herceptin and Perjeta postpartum. She had a follow-up MRI in 2019 showing a lesion in the right hepatic lobe which turned out to be hemangioma but there are also some enhancing focus is in the left breast retroareolar region and some clumped progressive enhancement in the right breast. Directed ultrasound showed a couple benign small fibroadenomas in the right breast and MRI biopsies were recommended in the right breast upper inner region and left breast retroareolar region. She decided to undergo bilateral total mastectomies and I also performed a localized sentinel lymph node biopsy in the left axilla and she had bilateral subpectoral direct implant reconstructions with AlloDerm on 2019. Final pathology showed 4 negative sentinel lymph nodes with the node with the clip just showing fibrosis. The cancer was completely gone from the left breast on final pathology only showing hyalinizing fibrosis. She did have another 6 nodes removed on the left side making a total of 10 negative nodes. This was a clinical prognostic stage IIA breast cancer prior to neoadjuvant chemotherapy with a complete pathologic response after neoadjuvant chemotherapy. The patient was seen by Dr. Brasher and received external beam radiation to Dr. Salas Sullivan at [facility] which started on 2019 and finished on 2019. She finished Herceptin through Dr. Brasher on 2020 and he attempted to put her on neratinib but she could not tolerate it. She had her right sided Port-A-Cath removed and did develop some hypertrophic scarring. She did have a right breast mastopexy and revision of her implants by Dr. Cyr in 2020.  She was found to have a palpable density over the inferior aspect of the left implant and underwent an ultrasound and ultrasound-guided core biopsy on April 15, 2022 ordered by Dr. Brasher at [facility] which showed fat necrosis.  She comes in now for routine follow-up.

## 2024-08-13 NOTE — PHYSICAL EXAM
[Normocephalic] : normocephalic [Atraumatic] : atraumatic [EOMI] : extra ocular movement intact [Supple] : supple [No Supraclavicular Adenopathy] : no supraclavicular adenopathy [No Cervical Adenopathy] : no cervical adenopathy [Examined in the supine and seated position] : examined in the supine and seated position [No dominant masses] : no dominant masses in right breast  [No dominant masses] : no dominant masses left breast [Breast Mass Right Breast ___cm] : no masses [Breast Mass Left Breast ___cm] : no masses [No Axillary Lymphadenopathy] : no left axillary lymphadenopathy [No Edema] : no edema [No Rashes] : no rashes [No Ulceration] : no ulceration [de-identified] : On exam, the patient has the obvious bilateral total mastectomies with a left sentinel lymph node biopsy and had bilateral direct to implant reconstructions in the subpectoral location.  She received radiation of the left implant and has some more tightness on the left side but it is acceptable.  She has no suspicious findings over either implant but did have a biopsy on the inferior aspect over the left implant in April 2022 showing fat necrosis.  Her cosmetic result improved after exchange of her implants in June 2020.  She did have a superficial burn in the upper outer aspect of the right breast and also developed a hypertrophic scar over the Port-A-Cath site.  She has no axillary, supraclavicular, or cervical adenopathy. [de-identified] : Status post total mastectomy with subpectoral direct to implant reconstruction with no suspicious findings. [de-identified] : Status post total mastectomy with subpectoral direct to implant reconstruction with no evidence of recurrence

## 2024-08-13 NOTE — ASSESSMENT
[FreeTextEntry1] : The patient is a 39-year-old G4, P3 premenopausal white female of [place] descent. She underwent menarche at age 12 and had her first child at age 31. She has a strong family history with her paternal aunt who had lung cancer at age 54 and her paternal great grandmother who had breast cancer at age 84. Her maternal great grandmother had rectal cancer at age 82 and then breast cancer at age 84 and then ovarian cancer at age 55. She has a maternal great aunt who had breast cancer at age 50 and her maternal grandfather had lung cancer at age 70. The patient had her first child in  with IVF and then had a failed IVF treatment in  and became pregnant with twins through IVF in 2018. She felt a left breast upper outer quadrant mass around the beginning of 2018 when she was 28 weeks pregnant with her twins and she was sent for an ultrasound which showed a suspicious density over the palpable region measuring 1.1 x 0.8 x 1.5 cm with a suspicious lower left axillary lymph node. Ultrasound-guided core biopsy showed a high-grade invasive duct cancer which was ER/TX negative HER-2/mal 3+ by IHC. Lymph node FNA also showed metastatic cancer. Genetic testing with [employer] panel testing was performed at [facility] showed a VUS in the T p53 gene. She was seen by Dr. Gibbs at the [place] [facility] and initially surgery was recommended. However, after discussing the case with Dr. Gibbs and the patient's gynecologist, Dr. Goode, it was decided for the patient to undergo neoadjuvant chemotherapy and she underwent AC through Dr. Brasher and then had a  delivering her twins and finished the AC chemotherapy and then Taxotere Herceptin and Perjeta postpartum. She had a follow-up MRI on 2019 showing a lesion in the right hepatic lobe which turned out to be hemangioma but there were also some enhancing focuses in the left breast retroareolar region and some clumped progressive enhancement in the right breast. Directed ultrasound showed a couple benign small fibroadenomas in the right breast and MRI biopsies were recommended in the right breast upper inner region and left breast retroareolar region. She decided to undergo bilateral total mastectomies and I also performed a localized sentinel lymph node biopsy in the left axilla and she had bilateral subpectoral direct to implant reconstructions with AlloDerm on 2019. Final pathology showed 4 negative sentinel lymph nodes with the node with the clip just showing fibrosis. The cancer was completely gone from the left breast on final pathology only showing hyalinizing fibrosis. She did have another 6 nodes removed on the left side making a total of 10 negative nodes. This was a clinical prognostic stage IIA breast cancer prior to neoadjuvant chemotherapy with a complete pathologic response after neoadjuvant chemotherapy. The patient was seen by Dr. Brasher and received external beam radiation through Dr. Salas Sullivan at [facility] which started on 2019 and finished on 2019. She finished Herceptin through Dr. Brasher on 2020 and he attempted to put her on neratinib but she could not tolerate it. She had her right sided Port-A-Cath removed and did develop some hypertrophic scarring. She did have a right breast mastopexy and revision of her implants by Dr. Cyr in 2020.  On exam today, I cannot feel any evidence of recurrence over the left implant.  She never underwent nipple reconstructions.  She continues have some tightness around her implants but this is stable and she has full range of motion and no signs of any lymphedema.  She did develop a palpable density felt over the lower aspect of the left implant and ended up undergoing an ultrasound and ultrasound-guided core biopsy at [facility] performed on April 15, 2022 which came back as fat necrosis which is likely due from her prior fat grafting.   She also lost about 40 pounds.  She underwent a follow up diagnostic left breast ultrasound at [facility] on 2023 which showed this previous left breast 6:00 periareolar area of fat necrosis to have dissipated.  The patient was reassured and can now follow-up yearly in 2025.  She continues to follow up with Dr. Brasher and does not require any further diagnostic imaging studies.  I did tell the patient that Dr. Cyr is now working down to [place] and if she needs any plastic surgery consultation she could always use Dr. Srivastava who has taken over Dr. Cyr's practice.

## 2025-07-09 ENCOUNTER — NON-APPOINTMENT (OUTPATIENT)
Age: 40
End: 2025-07-09

## 2025-08-26 ENCOUNTER — NON-APPOINTMENT (OUTPATIENT)
Age: 40
End: 2025-08-26

## 2025-08-26 ENCOUNTER — APPOINTMENT (OUTPATIENT)
Dept: BREAST CENTER | Facility: CLINIC | Age: 40
End: 2025-08-26
Payer: COMMERCIAL

## 2025-08-26 VITALS
WEIGHT: 156 LBS | BODY MASS INDEX: 26.63 KG/M2 | HEIGHT: 64 IN | HEART RATE: 57 BPM | DIASTOLIC BLOOD PRESSURE: 68 MMHG | SYSTOLIC BLOOD PRESSURE: 103 MMHG | OXYGEN SATURATION: 99 %

## 2025-08-26 DIAGNOSIS — Z90.13 ACQUIRED ABSENCE OF BILATERAL BREASTS AND NIPPLES: ICD-10-CM

## 2025-08-26 DIAGNOSIS — Z85.3 PERSONAL HISTORY OF MALIGNANT NEOPLASM OF BREAST: ICD-10-CM

## 2025-08-26 DIAGNOSIS — Z12.39 ENCOUNTER FOR OTHER SCREENING FOR MALIGNANT NEOPLASM OF BREAST: ICD-10-CM

## 2025-08-26 PROCEDURE — 99213 OFFICE O/P EST LOW 20 MIN: CPT
